# Patient Record
Sex: FEMALE | Race: WHITE | URBAN - METROPOLITAN AREA
[De-identification: names, ages, dates, MRNs, and addresses within clinical notes are randomized per-mention and may not be internally consistent; named-entity substitution may affect disease eponyms.]

---

## 2017-01-18 RX ORDER — AMOXICILLIN AND CLAVULANATE POTASSIUM 875; 125 MG/1; MG/1
1 TABLET, FILM COATED ORAL 2 TIMES DAILY
Qty: 6 TABLET | Refills: 0 | Status: SHIPPED | OUTPATIENT
Start: 2017-01-18 | End: 2017-08-02 | Stop reason: SDUPTHER

## 2017-02-27 ENCOUNTER — OFFICE VISIT (OUTPATIENT)
Dept: ORTHOPEDIC SURGERY | Age: 57
End: 2017-02-27

## 2017-02-27 VITALS
WEIGHT: 165 LBS | BODY MASS INDEX: 27.49 KG/M2 | DIASTOLIC BLOOD PRESSURE: 74 MMHG | HEART RATE: 77 BPM | SYSTOLIC BLOOD PRESSURE: 114 MMHG | HEIGHT: 65 IN

## 2017-02-27 DIAGNOSIS — Z96.641 S/P HIP REPLACEMENT, RIGHT: Primary | ICD-10-CM

## 2017-02-27 DIAGNOSIS — M16.11 PRIMARY OSTEOARTHRITIS OF RIGHT HIP: ICD-10-CM

## 2017-02-27 DIAGNOSIS — M25.551 RIGHT HIP PAIN: ICD-10-CM

## 2017-02-27 PROCEDURE — 73502 X-RAY EXAM HIP UNI 2-3 VIEWS: CPT | Performed by: ORTHOPAEDIC SURGERY

## 2017-02-27 PROCEDURE — 99213 OFFICE O/P EST LOW 20 MIN: CPT | Performed by: ORTHOPAEDIC SURGERY

## 2017-08-02 RX ORDER — AMOXICILLIN AND CLAVULANATE POTASSIUM 875; 125 MG/1; MG/1
1 TABLET, FILM COATED ORAL 2 TIMES DAILY
Qty: 6 TABLET | Refills: 0 | Status: SHIPPED | OUTPATIENT
Start: 2017-08-02 | End: 2017-08-05

## 2017-08-07 ENCOUNTER — TELEPHONE (OUTPATIENT)
Dept: ORTHOPEDIC SURGERY | Age: 57
End: 2017-08-07

## 2017-08-08 ENCOUNTER — OFFICE VISIT (OUTPATIENT)
Dept: DERMATOLOGY | Age: 57
End: 2017-08-08

## 2017-08-08 DIAGNOSIS — L65.0 TELOGEN EFFLUVIUM: ICD-10-CM

## 2017-08-08 DIAGNOSIS — H01.139 EYELID DERMATITIS, ECZEMATOUS, UNSPECIFIED LATERALITY: Primary | ICD-10-CM

## 2017-08-08 DIAGNOSIS — L81.4 SOLAR LENTIGO: ICD-10-CM

## 2017-08-08 PROCEDURE — 99213 OFFICE O/P EST LOW 20 MIN: CPT | Performed by: DERMATOLOGY

## 2018-02-08 RX ORDER — AMOXICILLIN AND CLAVULANATE POTASSIUM 875; 125 MG/1; MG/1
TABLET, FILM COATED ORAL
Qty: 6 TABLET | Refills: 0 | Status: SHIPPED | OUTPATIENT
Start: 2018-02-08 | End: 2018-08-28 | Stop reason: SDUPTHER

## 2018-08-14 ENCOUNTER — OFFICE VISIT (OUTPATIENT)
Dept: DERMATOLOGY | Age: 58
End: 2018-08-14

## 2018-08-14 DIAGNOSIS — R20.2 NOTALGIA PARESTHETICA: ICD-10-CM

## 2018-08-14 DIAGNOSIS — H01.139 EYELID DERMATITIS, ECZEMATOUS, UNSPECIFIED LATERALITY: Primary | ICD-10-CM

## 2018-08-14 PROCEDURE — 3017F COLORECTAL CA SCREEN DOC REV: CPT | Performed by: DERMATOLOGY

## 2018-08-14 PROCEDURE — 1036F TOBACCO NON-USER: CPT | Performed by: DERMATOLOGY

## 2018-08-14 PROCEDURE — G8427 DOCREV CUR MEDS BY ELIG CLIN: HCPCS | Performed by: DERMATOLOGY

## 2018-08-14 PROCEDURE — 99213 OFFICE O/P EST LOW 20 MIN: CPT | Performed by: DERMATOLOGY

## 2018-08-14 PROCEDURE — G8421 BMI NOT CALCULATED: HCPCS | Performed by: DERMATOLOGY

## 2018-08-14 NOTE — PROGRESS NOTES
determined to be abnormal: None. Well-appearing. 1.  Eyelids clear today. 2.  Right central mid upper back clear. Assessment and Plan     1. Eyelid dermatitis, eczematous - under good control    Protopic ointment twice daily as needed for recurrences. 2. Notalgia paresthetica     Education and reassurance. Sarna anti-itch lotion as needed.

## 2018-08-28 RX ORDER — AMOXICILLIN AND CLAVULANATE POTASSIUM 875; 125 MG/1; MG/1
TABLET, FILM COATED ORAL
Qty: 6 TABLET | Refills: 0 | Status: SHIPPED | OUTPATIENT
Start: 2018-08-28 | End: 2020-03-12

## 2019-08-14 ENCOUNTER — OFFICE VISIT (OUTPATIENT)
Dept: DERMATOLOGY | Age: 59
End: 2019-08-14
Payer: MEDICARE

## 2019-08-14 DIAGNOSIS — L72.0 EPIDERMOID CYST: Primary | ICD-10-CM

## 2019-08-14 DIAGNOSIS — L82.1 SK (SEBORRHEIC KERATOSIS): ICD-10-CM

## 2019-08-14 DIAGNOSIS — H01.139 EYELID DERMATITIS, ECZEMATOUS, UNSPECIFIED LATERALITY: ICD-10-CM

## 2019-08-14 DIAGNOSIS — D22.72 NEVUS OF LEFT THIGH: ICD-10-CM

## 2019-08-14 PROCEDURE — 1036F TOBACCO NON-USER: CPT | Performed by: DERMATOLOGY

## 2019-08-14 PROCEDURE — 99213 OFFICE O/P EST LOW 20 MIN: CPT | Performed by: DERMATOLOGY

## 2019-08-14 PROCEDURE — 3017F COLORECTAL CA SCREEN DOC REV: CPT | Performed by: DERMATOLOGY

## 2019-08-14 PROCEDURE — G8427 DOCREV CUR MEDS BY ELIG CLIN: HCPCS | Performed by: DERMATOLOGY

## 2019-08-14 PROCEDURE — G8421 BMI NOT CALCULATED: HCPCS | Performed by: DERMATOLOGY

## 2019-08-14 RX ORDER — LANOLIN ALCOHOL/MO/W.PET/CERES
3 CREAM (GRAM) TOPICAL DAILY
COMMUNITY
End: 2022-08-18

## 2020-03-12 ENCOUNTER — OFFICE VISIT (OUTPATIENT)
Dept: ORTHOPEDIC SURGERY | Age: 60
End: 2020-03-12
Payer: MEDICARE

## 2020-03-12 VITALS — HEIGHT: 65 IN | WEIGHT: 164.9 LBS | BODY MASS INDEX: 27.47 KG/M2

## 2020-03-12 PROCEDURE — G8419 CALC BMI OUT NRM PARAM NOF/U: HCPCS | Performed by: ORTHOPAEDIC SURGERY

## 2020-03-12 PROCEDURE — G8484 FLU IMMUNIZE NO ADMIN: HCPCS | Performed by: ORTHOPAEDIC SURGERY

## 2020-03-12 PROCEDURE — 1036F TOBACCO NON-USER: CPT | Performed by: ORTHOPAEDIC SURGERY

## 2020-03-12 PROCEDURE — 99203 OFFICE O/P NEW LOW 30 MIN: CPT | Performed by: ORTHOPAEDIC SURGERY

## 2020-03-12 PROCEDURE — G8427 DOCREV CUR MEDS BY ELIG CLIN: HCPCS | Performed by: ORTHOPAEDIC SURGERY

## 2020-03-12 PROCEDURE — 3017F COLORECTAL CA SCREEN DOC REV: CPT | Performed by: ORTHOPAEDIC SURGERY

## 2020-03-12 RX ORDER — IBUPROFEN 200 MG
200 TABLET ORAL EVERY 6 HOURS PRN
COMMUNITY

## 2020-03-12 NOTE — PROGRESS NOTES
injection    JOINT REPLACEMENT       Family History   Problem Relation Age of Onset    Heart Disease Mother     Heart Disease Father     Cancer Father         follicular lymphoma per PT      Social History     Socioeconomic History    Marital status:      Spouse name: None    Number of children: None    Years of education: None    Highest education level: None   Occupational History    None   Social Needs    Financial resource strain: None    Food insecurity     Worry: None     Inability: None    Transportation needs     Medical: None     Non-medical: None   Tobacco Use    Smoking status: Never Smoker    Smokeless tobacco: Never Used   Substance and Sexual Activity    Alcohol use: Yes     Alcohol/week: 0.0 standard drinks     Comment: 1-2 ON WK ENDS ONLY    Drug use: No    Sexual activity: None   Lifestyle    Physical activity     Days per week: None     Minutes per session: None    Stress: None   Relationships    Social connections     Talks on phone: None     Gets together: None     Attends Congregational service: None     Active member of club or organization: None     Attends meetings of clubs or organizations: None     Relationship status: None    Intimate partner violence     Fear of current or ex partner: None     Emotionally abused: None     Physically abused: None     Forced sexual activity: None   Other Topics Concern    None   Social History Narrative    None     Current Outpatient Medications   Medication Sig Dispense Refill    ibuprofen (ADVIL;MOTRIN) 200 MG tablet Take 200 mg by mouth every 6 hours as needed for Pain      melatonin 3 MG TABS tablet Take 3 mg by mouth daily      Calcium Carbonate-Vitamin D (CALCIUM-VITAMIN D) 500-200 MG-UNIT per tablet Take 1 tablet by mouth 2 times daily (with meals)      pimecrolimus (ELIDEL) 1 % cream Apply to the eyelids twice daily if needed for dermatitis.  30 g 2    Multiple Vitamins-Minerals (THERAPEUTIC MULTIVITAMIN-MINERALS) tablet Take 1 tablet by mouth daily       No current facility-administered medications for this visit. Current Outpatient Medications on File Prior to Visit   Medication Sig Dispense Refill    ibuprofen (ADVIL;MOTRIN) 200 MG tablet Take 200 mg by mouth every 6 hours as needed for Pain      melatonin 3 MG TABS tablet Take 3 mg by mouth daily      Calcium Carbonate-Vitamin D (CALCIUM-VITAMIN D) 500-200 MG-UNIT per tablet Take 1 tablet by mouth 2 times daily (with meals)      pimecrolimus (ELIDEL) 1 % cream Apply to the eyelids twice daily if needed for dermatitis. 30 g 2    Multiple Vitamins-Minerals (THERAPEUTIC MULTIVITAMIN-MINERALS) tablet Take 1 tablet by mouth daily       No current facility-administered medications on file prior to visit. No Known Allergies    Review of Systems:  Relevant review of systems reviewed and available in the patient's chart    Vital Signs: There were no vitals filed for this visit. General Exam:   Constitutional: Patient is adequately groomed with no evidence of malnutrition  DTRs: Deep tendon reflexes are intact  Mental Status: The patient is oriented to time, place and person. The patient's mood and affect are appropriate. Lymphatic: The lymphatic examination bilaterally reveals all areas to be without enlargement or induration. Vascular: Examination reveals no swelling or calf tenderness. Peripheral pulses are palpable and 2+. Neurological: The patient has good coordination. There is no weakness or sensory deficit. Body mass index is 27.44 kg/m². Left hip Examination:    Inspection:  No erythema or signs of infection. There are no cutaneous lesions. Palpation: Mild tenderness to palpation over the greater trochanteric region. Range of Motion:  Painful limited range of motion of the hip particularly with flexion and external rotation causing reproducible groin pain. Strength:  Core/5 strength in flexion and abduction limited by pain.     Special Tests:  There is a negative log roll maneuver. Positive straight leg raise against resistance. Positive Markus's test.  Negative Homans test.    Skin: There are no rashes, ulcerations or lesions. Gait: Mildly antalgic favoring the right side      Additional Comments:       Additional Examinations:         Contralateral Exam: Examination of the right hip reveals intact skin. The patient demonstrates full painless range of motion with regards to flexion, abduction, internal and external rotation. There is no tenderness about the greater trochanter. There is a negative straight leg raise against resistance. Strength is 5/5 throughout all planes. Lower Back: Examination of the lower back does not show any tenderness, deformity or injury. Range of motion is unremarkable. There is no gross instability. There are no rashes, ulcerations or lesions. Strength and tone are normal.    Radiology:     X-rays obtained and reviewed in office:  Views 2 views including AP pelvis and lateral  Location LEFT hip  Impression no fractures or malalignment identified. There is moderate osteoarthritis of the femoral acetabular joint space narrowing with subchondral cysts, sclerosis and osteophyte formation. Impression:  Encounter Diagnoses   Name Primary?  Pain of left hip joint Yes    Primary osteoarthritis of left hip        Office Procedures:  Orders Placed This Encounter   Procedures    XR HIP LEFT (2-3 VIEWS)     Standing Status:   Future     Number of Occurrences:   1     Standing Expiration Date:   3/9/2021       Treatment Plan:  I discussed with the patient the nature of osteoarthritis of the hip. We talked about treatment of arthritis and the various options that are involved with this. The patient understands that the treatments can vary from essentially doing nothing to a total joint replacement arthroplasty for arthritis.  I then went on to describe the utilization of glucosamine and chondroitin sulfate as a

## 2020-06-01 ENCOUNTER — OFFICE VISIT (OUTPATIENT)
Dept: ORTHOPEDIC SURGERY | Age: 60
End: 2020-06-01
Payer: MEDICARE

## 2020-06-01 VITALS — BODY MASS INDEX: 23.32 KG/M2 | HEIGHT: 65 IN | WEIGHT: 140 LBS

## 2020-06-01 PROCEDURE — 1036F TOBACCO NON-USER: CPT | Performed by: ORTHOPAEDIC SURGERY

## 2020-06-01 PROCEDURE — 3017F COLORECTAL CA SCREEN DOC REV: CPT | Performed by: ORTHOPAEDIC SURGERY

## 2020-06-01 PROCEDURE — 99214 OFFICE O/P EST MOD 30 MIN: CPT | Performed by: ORTHOPAEDIC SURGERY

## 2020-06-01 PROCEDURE — G8420 CALC BMI NORM PARAMETERS: HCPCS | Performed by: ORTHOPAEDIC SURGERY

## 2020-06-01 PROCEDURE — G8427 DOCREV CUR MEDS BY ELIG CLIN: HCPCS | Performed by: ORTHOPAEDIC SURGERY

## 2020-06-01 NOTE — PROGRESS NOTES
patellar    Additional Comments:     Examination of the right hip reveals intact skin. The patient demonstrates full painless range of motion with regards to flexion, abduction, internal and external rotation. There is no tenderness about the greater trochanter. There is a negative straight leg raise against resistance. Strength is 5/5 throughout all planes. Additional Examinations:         Right Upper Extremity:  Examination of the right upper extremity does not show any tenderness, deformity or injury. Range of motion is unremarkable. There is no gross instability. There are no rashes, ulcerations or lesions. Strength and tone are normal.  Left Upper Extremity: Examination of the left upper extremity does not show any tenderness, deformity or injury. Range of motion is unremarkable. There is no gross instability. There are no rashes, ulcerations or lesions. Strength and tone are normal.    Radiology:     X-rays obtained previously and reviewed in office:  Views 2  Location left hip  Impression no fractures or malalignment identified. There is end-stage osteoarthritis of the hip with femoral acetabular joint space narrowing with subchondral cysts, sclerosis and osteophyte formation. Failed Outpatient management or Previous Surgical Intervention  Patient has undergone conservative treatment of left hip for the past 6 months. Patient has undergone therapy consisting of NSAIDs, Tylenol, activity modifications for 6 months with no improvement in  pain relief or function. Impression:  Encounter Diagnosis   Name Primary?     Primary osteoarthritis of left hip Yes       Office Procedures:  Orders Placed This Encounter   Procedures    [facility] Occupation Therapy     Referral Priority:   Routine     Referral Type:   Eval and Treat     Referral Reason:   Specialty Services Required     Requested Specialty:   Occupational Therapy     Number of Visits Requested:   1       Treatment Plan:  I discussed with the patient the nature of osteoarthritis of the hip. We talked about treatment of arthritis and the various options that are involved with this. The patient understands that the treatments can vary from essentially doing nothing to a total joint replacement arthroplasty for arthritis. I then went on to describe the utilization of glucosamine and chondroitin sulfate as a joint nutrition product. We talked about the fact that this is essentially a joint vitamin with typically minimal side effects. We also talked about utilization of prescription over-the-counter anti-inflammatory medications as the next option. We talked about the typical course of this type of treatment and the fact that often times in the treatment for significant arthritis, this is successful less than half the time. We also talked about the corticosteroid injections and the fact that this can give a brief window of relief, but does not cure the problem; in fact, the pain often has a rebound effect in 6-10 weeks after the steroid has worn off. Lastly we discussed total joint replacement arthroplasty as the final and definitive step in treatment of arthritis. Patient realizes the magnitude of this type of treatment as well as having voiced a general understanding to the duration of the prosthesis. The patient voiced understanding to these continuum of treatment options. At this time the patient would like to go ahead and proceed with surgical intervention. We have recommended a left anterior total hip arthroplasty with robotic assistance. The patient is aware that this will require a nondiagnostic CT scan for surgical planning with the insurance company may or may not provide coverage for. This would be an out-of-pocket expense that the patient would be financially responsible for. We discussed the risk, benefits and complications of total hip replacement arthroplasty surgery.  We specifically discussed concerns

## 2020-06-01 NOTE — LETTER
Attention    These are medical screening labs, not pre-admission surgery labs. Via Chip Serrano M.D.  [phone]  [facility], [address] [facility]    Date:  2020    Name: Arnaldo Houston    : 1960    Please take this form with you.       THE FOLLOWING LABS NEED TO BE COMPLETED:    _x__UA  _x__URINE C/S (THIS NEEDS TO BE DONE EVEN IF THE UA IS NORMAL)  _x__CBC W/ DIFF  _x__PT/INR  _x__PTT  _x__TRANSFERRIN  _x__ALBUMIN  _x__CHEM 7  _x__HEMAGLOBIN A1-C  ____OTHER: _____________________________________________                         Diagnosis:  LT HIP OSTEOARTHRITIS            RT KNEE OA M17.11      LT KNEE OA M17.12         RT HIP OA  M16.11         LT HIP OA M16.12         RT SHLD OA  M19.011   LT SHLD OA  M19.012             Z01.812  (Pre-op examination code)      2020 4:20 PM  Heraclio Lewis PA-C
the PT Evaluation and completed labs has been determined you will be called and set up for surgery. This may take 1-2 days to check results and return your phone call. You will not be called about lab results if everything is normal.      Please make sure you have not blocked our number. Christina Simon will call from [phone] / [phone]. Also, please make sure your voice mail is not full.

## 2020-06-11 DIAGNOSIS — M25.552 PAIN OF LEFT HIP JOINT: Primary | ICD-10-CM

## 2020-06-30 ENCOUNTER — HOSPITAL ENCOUNTER (OUTPATIENT)
Dept: PHYSICAL THERAPY | Age: 60
Setting detail: THERAPIES SERIES
Discharge: HOME | End: 2020-06-30
Payer: COMMERCIAL

## 2020-06-30 PROCEDURE — 97161 PT EVAL LOW COMPLEX 20 MIN: CPT | Performed by: PHYSICAL THERAPIST

## 2020-06-30 PROCEDURE — 97110 THERAPEUTIC EXERCISES: CPT | Performed by: PHYSICAL THERAPIST

## 2020-06-30 NOTE — FLOWSHEET NOTE
[facility] - Orthopaedics and Sports Rehabilitation, [facility]    Physical Therapy Daily Treatment Note  Date:  2020    Patient Name:  Amol Cox    :  1960  MRN: [id number]  Restrictions/Precautions:    Medical/Treatment Diagnosis Information:  · Diagnosis: Left hip OA  M25.552  · Treatment Diagnosis: PT treatment diagnosis:  left hip pain  E00.085  Insurance/Certification information:    [employer]  60 visits/yr,  $50 copay  Physician Information:  Referring Practitioner: Dr Dominik Escobar of care signed (Y/N):     Date of Patient follow up with Physician:     G-Code (if applicable):      Date G-Code Applied:         Progress Note: [x]  Yes  []  No  Next due by: Visit #10       Latex Allergy:  [x]NO      []YES  Preferred Language for Healthcare:   [x]English       []other:    Visit # Insurance Allowable   1 60     Pain level:  6/10     SUBJECTIVE:  See eval    OBJECTIVE: See eval  Observation:   Test measurements:      RESTRICTIONS/PRECAUTIONS:  R THR, L ankle surgery    Exercises/Interventions:     Therapeutic Ex Sets/reps Notes        Seated HS stretch 3x30 sec HEP   Seated calf stretch 3x30 sec HEP   Seated QS BLEs 10 sec 10x HEP   Supine heelslide 1x10 HEP   Isometric abdominal 10 sec 10x HEP   LAQ  1x10 HEP   minisquats 1x10 HEP   Ankle Pumps x30 HEP                                      Manual Intervention                                   NMR re-education                                                      Therapeutic Exercise and NMR EXR  [x] (11363) Provided verbal/tactile cueing for activities related to strengthening, flexibility, endurance, ROM for improvements in LE, proximal hip, and core control with self care, mobility, lifting, ambulation.  [] (54818) Provided verbal/tactile cueing for activities related to improving balance, coordination, kinesthetic sense, posture, motor skill, proprioception  to assist with LE, proximal hip, and core control in self care, mobility, lifting, Traction (12138)  [] ES(attended) (54078)      [] ES (un) (01545):     GOALS:   Patient stated goal: To be prepared for surgery      Therapist goals for Patient:    Short Term Goals: To be achieved in: 2 weeks  1. Independent in HEP and progression per patient tolerance, in order to prevent re-injury and to prepare for surgery by strength and flexibility therex . Progression Towards Functional goals:  [] Patient is progressing as expected towards functional goals listed. [] Progression is slowed due to complexities listed. [] Progression has been slowed due to co-morbidities. [x] Plan just implemented, too soon to assess goals progression  [] Other:     ASSESSMENT:  See eval    Treatment/Activity Tolerance:  [x] Patient tolerated treatment well [] Patient limited by fatique  [] Patient limited by pain  [] Patient limited by other medical complications  [] Other:     Prognosis: [x] Good [] Fair  [] Poor        Recommend pt follow through with surgery date, recommend DC to home following sx.     Patient Requires Follow-up: [] Yes  [x] No    PLAN: See eval      [] Continue per plan of care [] Alter current plan (see comments)  [x] Plan of care initiated [] Discharge    Electronically signed by: Soni Ramos PT, OMT-C

## 2020-07-21 LAB
ALBUMIN SERPL-MCNC: 4.1 G/DL (ref 3.4–5)
ANION GAP SERPL CALCULATED.3IONS-SCNC: 11 MMOL/L (ref 3–16)
APTT: 33.2 SEC (ref 24.2–36.2)
BASOPHILS ABSOLUTE: 0 K/UL (ref 0–0.2)
BASOPHILS RELATIVE PERCENT: 0.9 %
BILIRUBIN URINE: NEGATIVE
BLOOD, URINE: NEGATIVE
BUN BLDV-MCNC: 16 MG/DL (ref 7–20)
CALCIUM SERPL-MCNC: 8.9 MG/DL (ref 8.3–10.6)
CHLORIDE BLD-SCNC: 105 MMOL/L (ref 99–110)
CLARITY: CLEAR
CO2: 24 MMOL/L (ref 21–32)
COLOR: YELLOW
CREAT SERPL-MCNC: 0.8 MG/DL (ref 0.6–1.2)
EOSINOPHILS ABSOLUTE: 0 K/UL (ref 0–0.6)
EOSINOPHILS RELATIVE PERCENT: 0.7 %
GFR AFRICAN AMERICAN: >60
GFR NON-AFRICAN AMERICAN: >60
GLUCOSE BLD-MCNC: 89 MG/DL (ref 70–99)
GLUCOSE URINE: NEGATIVE MG/DL
HCT VFR BLD CALC: 37.5 % (ref 36–48)
HEMOGLOBIN: 12.3 G/DL (ref 12–16)
INR BLD: 1.03 (ref 0.86–1.14)
KETONES, URINE: NEGATIVE MG/DL
LEUKOCYTE ESTERASE, URINE: NEGATIVE
LYMPHOCYTES ABSOLUTE: 1.1 K/UL (ref 1–5.1)
LYMPHOCYTES RELATIVE PERCENT: 27.6 %
MCH RBC QN AUTO: 32.8 PG (ref 26–34)
MCHC RBC AUTO-ENTMCNC: 32.8 G/DL (ref 31–36)
MCV RBC AUTO: 100.1 FL (ref 80–100)
MICROSCOPIC EXAMINATION: NORMAL
MONOCYTES ABSOLUTE: 0.4 K/UL (ref 0–1.3)
MONOCYTES RELATIVE PERCENT: 9.1 %
NEUTROPHILS ABSOLUTE: 2.5 K/UL (ref 1.7–7.7)
NEUTROPHILS RELATIVE PERCENT: 61.7 %
NITRITE, URINE: NEGATIVE
PDW BLD-RTO: 12.7 % (ref 12.4–15.4)
PH UA: 6 (ref 5–8)
PLATELET # BLD: 207 K/UL (ref 135–450)
PMV BLD AUTO: 9.8 FL (ref 5–10.5)
POTASSIUM SERPL-SCNC: 4.1 MMOL/L (ref 3.5–5.1)
PROTEIN UA: NEGATIVE MG/DL
PROTHROMBIN TIME: 12 SEC (ref 10–13.2)
RBC # BLD: 3.75 M/UL (ref 4–5.2)
SODIUM BLD-SCNC: 140 MMOL/L (ref 136–145)
SPECIFIC GRAVITY UA: 1.03 (ref 1–1.03)
TRANSFERRIN: 252 MG/DL (ref 200–360)
URINE TYPE: NORMAL
UROBILINOGEN, URINE: 0.2 E.U./DL
WBC # BLD: 4 K/UL (ref 4–11)

## 2020-07-22 LAB
ESTIMATED AVERAGE GLUCOSE: 96.8 MG/DL
HBA1C MFR BLD: 5 %
URINE CULTURE, ROUTINE: NORMAL

## 2020-08-05 ENCOUNTER — TELEPHONE (OUTPATIENT)
Dept: ORTHOPEDIC SURGERY | Age: 60
End: 2020-08-05

## 2020-08-11 LAB
ABO GROUPING: NORMAL
ANTIBODY SCREEN: NEGATIVE
RH FACTOR: NEGATIVE

## 2020-08-12 ENCOUNTER — OFFICE VISIT (OUTPATIENT)
Dept: DERMATOLOGY | Age: 60
End: 2020-08-12
Payer: COMMERCIAL

## 2020-08-12 VITALS — TEMPERATURE: 98 F

## 2020-08-12 PROCEDURE — 99213 OFFICE O/P EST LOW 20 MIN: CPT | Performed by: DERMATOLOGY

## 2020-08-12 NOTE — PROGRESS NOTES
[facility]  Emerson Gomez MD  [phone]      Alissa Downing  1960    56 y.o. female     Date of Visit: 2020    Chief Complaint: skin lesions    History of Present Illness:    1. She returns today to follow-up for history of eyelid dermatitis-still comes and goes. Improves with use of Protopic 0.1% ointment. 2.  She has stable pigmented lesions on the lower extremities. 3.  She also complains of few asymptomatic lesions on the chest.      Review of Systems:  Skin: No new or changing moles. Past Medical History, Family History, Surgical History, Medications and Allergies reviewed. Past Medical History:   Diagnosis Date    Arthritis     Fractures     LEFT ANKLE FX X2    PONV (postoperative nausea and vomiting)      Past Surgical History:   Procedure Laterality Date    ANKLE FRACTURE SURGERY Left     BONE CHIP REMOVED     SECTION      X1    HAND SURGERY Left     PINKY SEVERED NERVE    HIP ARTHROPLASTY Right 16    anterior    HIP SURGERY Right 11/3/15    injection    JOINT REPLACEMENT         No Known Allergies  Outpatient Medications Marked as Taking for the 20 encounter (Office Visit) with Calli Main MD   Medication Sig Dispense Refill    mupirocin (BACTROBAN) 2 % ointment 1 22 gram tube. Apply 1 cm (small drop) to a q-tip and swab the inside of each nostril twice a day starting 5 days prior to surgery.  1 Tube 0    ibuprofen (ADVIL;MOTRIN) 200 MG tablet Take 200 mg by mouth every 6 hours as needed for Pain      melatonin 3 MG TABS tablet Take 3 mg by mouth daily      Calcium Carbonate-Vitamin D (CALCIUM-VITAMIN D) 500-200 MG-UNIT per tablet Take 1 tablet by mouth 2 times daily (with meals)      Multiple Vitamins-Minerals (THERAPEUTIC MULTIVITAMIN-MINERALS) tablet Take 1 tablet by mouth daily         Social History:  Occupation:  Volunteers at [employer], former teacher.       Physical Examination       The following were examined and determined to be normal: Psych/Neuro, Scalp/hair, Head/face, Conjunctivae/eyelids, Gums/teeth/lips, Neck, Breast/axilla/chest, Abdomen, Back, RUE, LUE, RLE, LLE and Nails/digits. The following were examined and determined to be abnormal: None. Well appearing. 1.  Clear. 2.  Lower extremities with several well defined round smooth light brown macules and patches. 3.  Upper chest -few stuck on appearing waxy brown papules. Assessment and Plan     1. Eyelid dermatitis, eczematous - clear today    Protopic 0.1% ointment twice daily as needed for recurrences. 2. Solar lentigines    Monitor for change. 3.  Seborrheic keratoses    Reassurance. Return in about 1 year (around 8/12/2021).

## 2020-08-13 LAB — SARS-COV-2: NOT DETECTED

## 2020-08-14 ENCOUNTER — TELEPHONE (OUTPATIENT)
Dept: ORTHOPEDIC SURGERY | Age: 60
End: 2020-08-14

## 2020-08-17 LAB
ABO GROUPING: NORMAL
RH FACTOR: NEGATIVE

## 2020-08-18 RX ORDER — CEPHALEXIN 500 MG/1
500 CAPSULE ORAL 4 TIMES DAILY
Qty: 4 CAPSULE | Refills: 0 | Status: SHIPPED | OUTPATIENT
Start: 2020-08-18 | End: 2020-09-21 | Stop reason: ALTCHOICE

## 2020-08-20 ENCOUNTER — TELEPHONE (OUTPATIENT)
Dept: ORTHOPEDIC SURGERY | Age: 60
End: 2020-08-20

## 2020-08-20 NOTE — TELEPHONE ENCOUNTER
PATIENT CALLED, HAD SURGERY ON MONDAY, CALLED HAS QUESTIONS, MISSED A PHONE CALLED, TRANSFER HER BACK TO DR. Khari Reynoso OFFICE. [phone]

## 2020-08-24 ENCOUNTER — TELEPHONE (OUTPATIENT)
Dept: ORTHOPEDIC SURGERY | Age: 60
End: 2020-08-24

## 2020-08-24 NOTE — TELEPHONE ENCOUNTER
1.  There was no information on the prescription as to how long to be taking the aspirin. 2. Changed my bandage today and everything looked great. How long should I keep the bandage on going forward? 3. There is a warning on our meloxicam prescription about taking it in conjunction with aspirin. How do I proceed with the aspirin requirement as Im supposed to start to take the meloxicam tomorrow? 4. How long should I be using crutches? 5. How long do I need to wear the support stockings? When am I going to be cleared to drive? I know this was an issue if I was taking any of the narcotics but again have been free from narcotics since Friday mid day.

## 2020-08-31 ENCOUNTER — VIRTUAL VISIT (OUTPATIENT)
Dept: ORTHOPEDIC SURGERY | Age: 60
End: 2020-08-31

## 2020-08-31 PROCEDURE — 99024 POSTOP FOLLOW-UP VISIT: CPT | Performed by: PHYSICIAN ASSISTANT

## 2020-08-31 NOTE — PROGRESS NOTES
Mikey Katz is a 56 y.o. female being evaluated by a Virtual Visit (video visit) encounter to address concerns as mentioned above. A caregiver was present when appropriate. Due to this being a TeleHealth encounter (During FSOOZ-83 public health emergency), evaluation of the following organ systems was limited: Vitals/Constitutional/EENT/Resp/CV/GI//MS/Neuro/Skin/Heme-Lymph-Imm. Pursuant to the emergency declaration under the [address] [place] [address] and the [employer] Act, this Virtual Visit was conducted with patient's (and/or legal guardian's) consent, to reduce the patient's risk of exposure to COVID-19 and provide necessary medical care. The patient (and/or legal guardian) has also been advised to contact this office for worsening conditions or problems, and seek emergency medical treatment and/or call 911 if deemed necessary. Services were provided through a video synchronous discussion virtually to substitute for in-person clinic visit. Patient's location was at their home. --Stephanie Hurd PA-C on 8/31/2020 at 4:32 PM    An electronic signature was used to authenticate this note. History of present illness: The patient returns today after left total hip replacement. Pain control as been satisfactory with oral medications. There have been no fevers or chills. Physical examination: The incision is clean, dry, and intact with no drainage or signs of infection. There is expected swelling with no evidence of DVT and a negative Homans sign. Neurovascular exam is intact. Assessment/plan: We would like to begin physical therapy to restore range of motion and strength, at 4 weeks. . The patient was given local wound care instructions. We did not refilled their pain medication today. He will followup in 2 weeks for reevaluation.

## 2020-09-21 ENCOUNTER — OFFICE VISIT (OUTPATIENT)
Dept: ORTHOPEDIC SURGERY | Age: 60
End: 2020-09-21

## 2020-09-21 ENCOUNTER — HOSPITAL ENCOUNTER (OUTPATIENT)
Dept: PHYSICAL THERAPY | Age: 60
Setting detail: THERAPIES SERIES
Discharge: HOME | End: 2020-09-21
Payer: COMMERCIAL

## 2020-09-21 PROCEDURE — 97110 THERAPEUTIC EXERCISES: CPT | Performed by: PHYSICAL THERAPIST

## 2020-09-21 PROCEDURE — 97161 PT EVAL LOW COMPLEX 20 MIN: CPT | Performed by: PHYSICAL THERAPIST

## 2020-09-21 PROCEDURE — 99024 POSTOP FOLLOW-UP VISIT: CPT | Performed by: ORTHOPAEDIC SURGERY

## 2020-09-21 PROCEDURE — 97140 MANUAL THERAPY 1/> REGIONS: CPT | Performed by: PHYSICAL THERAPIST

## 2020-09-21 RX ORDER — AMOXICILLIN AND CLAVULANATE POTASSIUM 875; 125 MG/1; MG/1
TABLET, FILM COATED ORAL
Qty: 6 TABLET | Refills: 0 | Status: SHIPPED | OUTPATIENT
Start: 2020-09-21 | End: 2021-08-18

## 2020-09-21 NOTE — PLAN OF CARE
feels she is improving since surgery. Pt says has two story home but has first floor master arrangement so has been able to avoid stairs. Pt has pain and difficulty with walking more than 20 min, stiff after prolonged sitting, don/doff shoes/socks, pt still disturbed by pain but is improving since surgery and unable to sleep on side like she usually does. Pt has 22 mo old grandchild that she wants to be sure she can  safely. Pt has weaned herself from crutches last week and feels that has gone really well. Pt is still icing throughout the day and using Mobic. 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    Living Status/Prior Level of Function: Independent with ADLs and IADLs, return to walking and care for grandchild    OBJECTIVE:     ROM LEFT RIGHT   HIP Flex     HIP Abd     HIP Ext     HIP IR     HIP ER     Knee ext     Knee Flex     Ankle PF     Ankle DF     Ankle In     Ankle Ev     Strength  LEFT RIGHT   HIP Flexors     HIP Abductors     HIP Ext     Hip ER     Knee EXT (quad)     Knee Flex (HS)     Ankle DF     Ankle PF     Ankle Inv     Ankle EV          Circumference  Mid apex  7 cm prox             Reflexes/Sensation:    []Dermatomes/Myotomes intact    [x]Reflexes equal and normal bilaterally   []Other:    Joint mobility:    []Normal    []Hypo   []Hyper    Palpation: mild tenderness around anterior incision site, tight and tender L ITB    Functional Mobility/Transfers: WFL    Posture:  WNL    Bandages/Dressings/Incisions: pt reported healing well without concern, pt's clothing limited observation    Gait: (include devices/WB status) no AD, antalgic gait with mild L Trendelenberg and compensated lateral torso lean, mild reduced hip extension during terminal stance. Orthopedic Special Tests: n/a                       [x] Patient history, allergies, meds reviewed. Medical chart reviewed. See intake form. Review Of Systems (ROS):  [x]Performed Review of systems (Integumentary, CardioPulmonary, Neurological) by intake and observation. Intake form has been scanned into medical record. Patient has been instructed to contact their primary care physician regarding ROS issues if not already being addressed at this time.       Co-morbidities/Complexities (which will affect course of rehabilitation):   []None           Arthritic conditions   []Rheumatoid arthritis (M05.9)  []Osteoarthritis (M19.91)   Cardiovascular conditions   []Hypertension (I10)  []Hyperlipidemia (E78.5)  []Angina pectoris (I20)  []Atherosclerosis (I70)   Musculoskeletal conditions   []Disc pathology   []Congenital spine pathologies   []Prior surgical intervention  []Osteoporosis (M81.8)  []Osteopenia (M85.8)   Endocrine conditions   []Hypothyroid (E03.9)  []Hyperthyroid Gastrointestinal conditions   []Constipation (Z18.62)   Metabolic conditions   []Morbid obesity (E66.01)  []Diabetes type 1(E10.65) or 2 (E11.65)   []Neuropathy (G60.9)     Pulmonary conditions   []Asthma (J45)  []Coughing   []COPD (J44.9)   Psychological Disorders  []Anxiety (F41.9)  []Depression (F32.9)   []Other:   []Other:          Barriers to/and or personal factors that will affect rehab potential:              []Age  []Sex              []Motivation/Lack of Motivation                        []Co-Morbidities              []Cognitive Function, education/learning barriers              []Environmental, home barriers              []profession/work barriers  []past PT/medical experience  []other:  Justification:     Falls Risk Assessment (30 days):   [x] Falls Risk assessed and no intervention required. [] Falls Risk assessed and Patient requires intervention due to being higher risk   TUG score (>12s at risk):     [] Falls education provided, including           ASSESSMENT: Pt is s/p L KAY performed by Dr. Lex Perez 8/17/2020 with PMHx of R KAY in 2016. She demonstrates an antalgic gait with reduced use of hip ext ROM and Trendelenberg weakness, decreased hip abd, ext, and flex ROM, decreased hip and glute strength, and limited tolerance for prolonged positions and ambulation. PT will benefit from PT to address these impairments and improve ADLs and community ambulation. Functional Impairments:      []Noted lumbar/proximal hip/LE joint hypomobility   [x]Decreased LE functional ROM   [x]Decreased core/proximal hip strength and neuromuscular control   [x]Decreased LE functional strength   []Reduced balance/proprioceptive control   []other:      Functional Activity Limitations (from functional questionnaire and intake)   [x]Reduced ability to tolerate prolonged functional positions   []Reduced ability or difficulty with changes of positions or transfers between positions   []Reduced ability to maintain good posture and demonstrate good body mechanics with sitting, bending, and lifting   [x]Reduced ability to sleep   [] Reduced ability or tolerance with driving and/or computer work   [x]Reduced ability to perform lifting, carrying tasks   [x]Reduced ability to squat   []Reduced ability to forward bend   [x]Reduced ability to ambulate prolonged functional periods/distances/surfaces   [x]Reduced ability to ascend/descend stairs   []Reduced ability to run, hop, cut or jump   []other:    Participation Restrictions   [x]Reduced participation in self care activities   [x]Reduced participation in home management activities   []Reduced participation in work activities   [x]Reduced participation in social activities.    []Reduced participation in sport/recreation activities. Classification :    [x]Signs/symptoms consistent with post-surgical status including decreased ROM, strength and function. []Signs/symptoms consistent with joint sprain/strain   []Signs/symptoms consistent with patella-femoral syndrome   []Signs/symptoms consistent with knee OA/hip OA   []Signs/symptoms consistent with internal derangement of knee/Hip   []Signs/symptoms consistent with functional hip weakness/NMR control      []Signs/symptoms consistent with tendinitis/tendinosis    []signs/symptoms consistent with pathology which may benefit from Dry needling      []other:      Prognosis/Rehab Potential:      []Excellent   [x]Good    []Fair   []Poor    Tolerance of evaluation/treatment:    []Excellent   [x]Good    []Fair   []Poor  Physical Therapy Evaluation Complexity Justification  [x] A history of present problem with:  [x] no personal factors and/or comorbidities that impact the plan of care;  []1-2 personal factors and/or comorbidities that impact the plan of care  []3 personal factors and/or comorbidities that impact the plan of care  [x] An examination of body systems using standardized tests and measures addressing any of the following: body structures and functions (impairments), activity limitations, and/or participation restrictions;:  [x] a total of 1-2 or more elements   [] a total of 3 or more elements   [] a total of 4 or more elements   [x] A clinical presentation with:  [x] stable and/or uncomplicated characteristics   [] evolving clinical presentation with changing characteristics  [] unstable and unpredictable characteristics;   [x] Clinical decision making of [x] low, [] moderate, [] high complexity using standardized patient assessment instrument and/or measurable assessment of functional outcome.     [x] EVAL (LOW) 08380 (typically 20 minutes face-to-face)  [] EVAL (MOD) 51362 (typically 30 minutes face-to-face)  [] EVAL (HIGH) 78853 (typically 45 minutes face-to-face)  [] reciprocal gait pattern. [] Progressing: [] Met: [] Not Met: [] Adjusted  5. Pt brodie to squat and  toddler grandchild with good mechanics and without pain.    [] Progressing: [] Met: [] Not Met: [] Adjusted     Electronically signed by:  Ld nAgelo, PT , MPT

## 2020-09-21 NOTE — PROGRESS NOTES
History of present illness: The patient returns today after left anterior total hip replacement on 8/17/2020. Pain control as been satisfactory with oral medications. There have been no fevers or chills. Pain Assessment  Location of Pain: Pelvis(hip)  Location Modifiers: Left  Severity of Pain: 3(at worst)  Quality of Pain: Dull, Aching  Duration of Pain: Persistent  Frequency of Pain: Intermittent  Aggravating Factors: Other (Comment)(sleeping at night)  Limiting Behavior: Some  Relieving Factors: Rest, Nsaids, Ice, Other (Comment)]    Physical examination left hip: The incision is clean, dry, and intact with no drainage or signs of infection. There is expected swelling with no evidence of DVT and a negative Homans sign. Neurovascular exam is intact. Leg length is appropriate. Radiographs: X-rays taken today including AP pelvis, and lateral views of the left hip show excellent alignment of the prosthesis. No evidence of septic or aseptic loosening or periprosthetic fractures. Assessment/plan: We would like to begin physical therapy to restore range of motion and strength. The patient was given local wound care instructions. We did not refill their pain medication today. They will followup in 3-4 weeks for reevaluation. Patient is feeling better. She did start physical therapy yesterday.   She still having some difficulty sleeping at night and we did discuss Tylenol PM.

## 2020-09-21 NOTE — FLOWSHEET NOTE
[facility] - orthopaedics and sports medicine; [facility]     Physical Therapy Treatment Note/ Progress Report:           Date:  2020    Patient Name:  Crispin Taylor    :  1960  MRN: [id number]  Restrictions/Precautions:    Medical/Treatment Diagnosis Information:  Diagnosis: W87.635 presence of artificial left hip  Treatment Diagnosis: M25.652 L hip stiffness, R26.2 difficulty walking, M62.82 L hip weakness, M25.462 L hip pain  Insurance/Certification information:  PT Insurance Information: [employer], 60 visits comb, needs auth  Physician Information:  Referring Practitioner: Dr. Masha Moon  Has the plan of care been signed (Y/N):        []  Yes  [x]  No     Date of Patient follow up with Physician: later today      Is this a Progress Report:     []  Yes  [x]  No        If Yes:  Date Range for reporting period:  Beginning 2020  Ending      Progress report will be due (10 Rx or 30 days whichever is less):       Recertification will be due (POC Duration  / 90 days whichever is less): 2020      Visit # Insurance Allowable Requires auth   1 ?     []no        [x]yes:     Functional Scale: LEFS: 47/80 = 59% ability, 49% disability   Date assessed:  2020          Number of Comorbidities:  []0     []1-2    []3+    Latex Allergy:  [x]NO      []YES  Preferred Language for Healthcare:   [x]English       []other:      Pain level:  2-3/10     SUBJECTIVE:  See eval    OBJECTIVE: See eval   Observation:    Test measurements:      RESTRICTIONS/PRECAUTIONS: none    Exercises/Interventions:     Therapeutic Ex (40041) Sets/Reps/ sec Notes/CUES   Clamshell 3x10 L HEP    Supine hip abd AROM 3x10 L HEP    Bridge 3x10 HEP    Supine clamshell Green, 3x10    Mini-squat 3x10 HEP    LAQ 3x10    Seated bilat hip IR Red, 3x10                             Manual Intervention (25836)     STM/MFR: L ITB 6 min                             NMR re-education (64540)  CUES NEEDED Therapeutic Activity (15784)                         Modalities     CP 10 min          Therapeutic Exercise and NMR EXR  [x] (13976) Provided verbal/tactile cueing for activities related to strengthening, flexibility, endurance, ROM for improvements in LE, proximal hip, and core control with self care, mobility, lifting, ambulation.  [] (01792) Provided verbal/tactile cueing for activities related to improving balance, coordination, kinesthetic sense, posture, motor skill, proprioception  to assist with LE, proximal hip, and core control in self care, mobility, lifting, ambulation and eccentric single leg control.      NMR and Therapeutic Activities:    [] (29690 or 30088) Provided verbal/tactile cueing for activities related to improving balance, coordination, kinesthetic sense, posture, motor skill, proprioception and motor activation to allow for proper function of core, proximal hip and LE with self care and ADLs  [] (81142) Gait Re-education- Provided training and instruction to the patient for proper LE, core and proximal hip recruitment and positioning and eccentric body weight control with ambulation re-education including up and down stairs     Home Exercise Program:    [x] (96306) Reviewed/Progressed HEP activities related to strengthening, flexibility, endurance, ROM of core, proximal hip and LE for functional self-care, mobility, lifting and ambulation/stair navigation   [] (31841)Reviewed/Progressed HEP activities related to improving balance, coordination, kinesthetic sense, posture, motor skill, proprioception of core, proximal hip and LE for self care, mobility, lifting, and ambulation/stair navigation      Manual Treatments:  PROM / STM / Oscillations-Mobs:  G-I, II, III, IV (PA's, Inf., Post.)  [x] (02217) Provided manual therapy to mobilize LE, proximal hip and/or LS spine soft tissue/joints for the purpose of modulating pain, promoting relaxation,  increasing ROM, reducing/eliminating soft tissue swelling/inflammation/restriction, improving soft tissue extensibility and allowing for proper ROM for normal function with self care, mobility, lifting and ambulation. Modalities:     [] GAME READY (VASO)- for significant edema, swelling, pain control. Charges:  Timed Code Treatment Minutes: 32'    Total Treatment Minutes: 61'        [x] EVAL (LOW) 455 1011   [] EVAL (MOD) 68241  [] EVAL (HIGH) 20942   [] RE-EVAL     [x] CN(35134) x   1  [] IONTO  [] NMR (17858) x     [] VASO  [x] Manual (46545) x    1  [] Other:  [] TA x      [] Mech Traction (02438)  [] ES(attended) (38687)      [] ES (un) (17795):       GOALS:   Patient stated goal: Get back to full activity level and better sleep. []? Progressing: []? Met: []? Not Met: []? Adjusted     Therapist goals for Patient:   Short Term Goals: To be achieved in: 2 weeks  1. Independent in HEP and progression per patient tolerance, in order to prevent re-injury. []? Progressing: []? Met: []? Not Met: []? Adjusted  2. Patient will have a decrease in pain to facilitate improvement in movement, function, and ADLs as indicated by Functional Deficits. []? Progressing: []? Met: []? Not Met: []? Adjusted     Long Term Goals: To be achieved in: 8 weeks  1. Disability index score of 10% or less for the LEFS to assist with reaching prior level of function. []? Progressing: []? Met: []? Not Met: []? Adjusted  2. Patient will demonstrate increased AROM  WNL and/or symmetrical to other side to allow for proper joint functioning as indicated by patients Functional Deficits. []? Progressing: []? Met: []? Not Met: []? Adjusted  3. Patient will demonstrate an increase in Strength to good proximal hip strength and control, within 5lb HHD in LE to allow for proper functional mobility as indicated by patients Functional Deficits. []? Progressing: []? Met: []? Not Met: []? Adjusted  4.  Patient will return to ambulation for at least 1 hour without AD, pain or limping and a normal reciprocal gait pattern. []? Progressing: []? Met: []? Not Met: []? Adjusted  5. Pt brodie to squat and  toddler grandchild with good mechanics and without pain. []? Progressing: []? Met: []? Not Met: []? Adjusted    Progression Towards Functional goals:  [] Patient is progressing as expected towards functional goals listed. [] Progression is slowed due to complexities listed. [] Progression has been slowed due to co-morbidities. [x] Plan just implemented, too soon to assess goals progression  [] Other:         Overall Progression Towards Functional goals/ Treatment Progress Update:  [] Patient is progressing as expected towards functional goals listed. [] Progression is slowed due to complexities/Impairments listed. [] Progression has been slowed due to co-morbidities. [x] Plan just implemented, too soon to assess goals progression <30days   [] Goals require adjustment due to lack of progress  [] Patient is not progressing as expected and requires additional follow up with physician  [] Other    Prognosis for POC: [x] Good [] Fair  [] Poor      Patient requires continued skilled intervention: [x] Yes  [] No    Treatment/Activity Tolerance:  [x] Patient able to complete treatment  [] Patient limited by fatigue  [] Patient limited by pain    [] Patient limited by other medical complications  [] Other:     ASSESSMENT: Pt did very well with initial session. She reported mild hip soreness at end of session but CP helped. Pt felt comfortable with starting the HEP on her own at home. PLAN: See eval. Pt to be seen 2xwk for 8 wks. Next visit: progress HEP, standing hip abd and ext, calf raises, bwd walking to regain  Hip ext.   [] Continue per plan of care [] Alter current plan (see comments above)  [x] Plan of care initiated [] Hold pending MD visit [] Discharge      Electronically signed by:  Sebastian Amaral, PT, MPT     Note: If patient

## 2020-09-23 ENCOUNTER — HOSPITAL ENCOUNTER (OUTPATIENT)
Dept: PHYSICAL THERAPY | Age: 60
Setting detail: THERAPIES SERIES
Discharge: HOME | End: 2020-09-23
Payer: COMMERCIAL

## 2020-09-23 PROCEDURE — 97140 MANUAL THERAPY 1/> REGIONS: CPT

## 2020-09-23 PROCEDURE — 97110 THERAPEUTIC EXERCISES: CPT

## 2020-09-23 NOTE — FLOWSHEET NOTE
[facility] - orthopaedics and sports medicine; [facility]     Physical Therapy Treatment Note/ Progress Report:           Date:  2020    Patient Name:  Princess Novoa    :  1960  MRN: [id number]  Restrictions/Precautions:    Medical/Treatment Diagnosis Information:  Diagnosis: R88.692 presence of artificial left hip  Treatment Diagnosis: M25.652 L hip stiffness, R26.2 difficulty walking, M62.82 L hip weakness, M25.462 L hip pain  Insurance/Certification information:  PT Insurance Information: [employer], 60 visits comb, needs auth  Physician Information:  Referring Practitioner: Dr. Arti Santana  Has the plan of care been signed (Y/N):        []  Yes  [x]  No     Date of Patient follow up with Physician: mid-Oct.    Surgery date: 2020    Is this a Progress Report:     []  Yes  [x]  No        If Yes:  Date Range for reporting period:  Beginning 2020  Ending      Progress report will be due (10 Rx or 30 days whichever is less):       Recertification will be due (POC Duration  / 90 days whichever is less): 2020      Visit # Insurance Allowable Requires auth   2 ? []no        [x]yes:     Functional Scale: LEFS: 47/80 = 59% ability, 49% disability   Date assessed:  2020          Number of Comorbidities:  [x]0     []1-2    []3+    Latex Allergy:  [x]NO      []YES  Preferred Language for Healthcare:   [x]English       []other:      Pain level:  2-3/10     SUBJECTIVE:  Pt reports she was sore for a little more than 24 hours after last session but icing helped. She says the icing helped and the HEP does not seem to cause any soreness and going well. OBJECTIVE:    Observation: mild tenderness still over the anterior L hip incision. Gait mildly antalgic with Trendelenberg pattern.     Test measurements:  Hip flexion: 105 deg, hip abd: 30 deg, Hip ER: 30 deg    RESTRICTIONS/PRECAUTIONS: none    Exercises/Interventions:     Therapeutic Ex (08510) Sets/Reps/ sec Notes/CUES   Recum bike AAROM 7 min    Clamshell Green 2x10 L HEP 9/21   Supine hip abd AROM  HEP 9/21   Bridge 2x10 HEP 9/21   Supine clamshell Green, 2x10    Mini-squat 3x10 HEP 9/21   LAQ w/ ball squeeze 3x10, 2# R/L    Seated bilat hip IR Red, 2x10    Standing hip abd & ext 2x10ea R/L     Prone quad stretch 2x30\"L HEP 9/23                  Manual Intervention (12297)     STM/MFR: L ITB 5 min    PROM: hip felx, abd, 90/90 ER, CINDY 10 min                        NMR re-education (75208)  CUES NEEDED   SLB 2x30\" L Cued for torso posture                                           Therapeutic Activity (73730)                         Modalities     CP 10 min          Therapeutic Exercise and NMR EXR  [x] (24897) Provided verbal/tactile cueing for activities related to strengthening, flexibility, endurance, ROM for improvements in LE, proximal hip, and core control with self care, mobility, lifting, ambulation.  [] (04865) Provided verbal/tactile cueing for activities related to improving balance, coordination, kinesthetic sense, posture, motor skill, proprioception  to assist with LE, proximal hip, and core control in self care, mobility, lifting, ambulation and eccentric single leg control.      NMR and Therapeutic Activities:    [] (75933 or 45529) Provided verbal/tactile cueing for activities related to improving balance, coordination, kinesthetic sense, posture, motor skill, proprioception and motor activation to allow for proper function of core, proximal hip and LE with self care and ADLs  [] (09556) Gait Re-education- Provided training and instruction to the patient for proper LE, core and proximal hip recruitment and positioning and eccentric body weight control with ambulation re-education including up and down stairs     Home Exercise Program:    [x] (94589) Reviewed/Progressed HEP activities related to strengthening, flexibility, endurance, ROM of core, proximal hip and LE for functional self-care, mobility, lifting and Adjusted  2. Patient will demonstrate increased AROM  WNL and/or symmetrical to other side to allow for proper joint functioning as indicated by patients Functional Deficits. []? Progressing: []? Met: []? Not Met: []? Adjusted  3. Patient will demonstrate an increase in Strength to good proximal hip strength and control, within 5lb HHD in LE to allow for proper functional mobility as indicated by patients Functional Deficits. []? Progressing: []? Met: []? Not Met: []? Adjusted  4. Patient will return to ambulation for at least 1 hour without AD, pain or limping and a normal reciprocal gait pattern. []? Progressing: []? Met: []? Not Met: []? Adjusted  5. Pt brodie to squat and  toddler grandchild with good mechanics and without pain. []? Progressing: []? Met: []? Not Met: []? Adjusted    Progression Towards Functional goals:  [] Patient is progressing as expected towards functional goals listed. [] Progression is slowed due to complexities listed. [] Progression has been slowed due to co-morbidities. [x] Plan just implemented, too soon to assess goals progression  [] Other:         Overall Progression Towards Functional goals/ Treatment Progress Update:  [] Patient is progressing as expected towards functional goals listed. [] Progression is slowed due to complexities/Impairments listed. [] Progression has been slowed due to co-morbidities.   [x] Plan just implemented, too soon to assess goals progression <30days   [] Goals require adjustment due to lack of progress  [] Patient is not progressing as expected and requires additional follow up with physician  [] Other    Prognosis for POC: [x] Good [] Fair  [] Poor      Patient requires continued skilled intervention: [x] Yes  [] No    Treatment/Activity Tolerance:  [x] Patient able to complete treatment  [] Patient limited by fatigue  [] Patient limited by pain    [] Patient limited by other medical complications  [] Other:     ASSESSMENT: Pt reports that the stretching and ROM felt good today. She needed cueing to control torso lean compensating for Trendelenberg with L SLS activities today. PLAN: See shirin. Pt to be seen 2xwk for 8 wks. Next visit: progress HEP, calf raises, bwd walking to regain hip ext. [x] Continue per plan of care [] Alter current plan (see comments above)  [] Plan of care initiated [] Hold pending MD visit [] Discharge      Electronically signed by:  Armand Sexton, PT    Note: If patient does not return for scheduled/ recommended follow up visits, this note will serve as a discharge from care along with most recent update on progress.

## 2020-09-28 ENCOUNTER — HOSPITAL ENCOUNTER (OUTPATIENT)
Dept: PHYSICAL THERAPY | Age: 60
Setting detail: THERAPIES SERIES
Discharge: HOME | End: 2020-09-28
Payer: COMMERCIAL

## 2020-09-28 PROCEDURE — 97110 THERAPEUTIC EXERCISES: CPT

## 2020-09-28 PROCEDURE — 97140 MANUAL THERAPY 1/> REGIONS: CPT

## 2020-09-28 NOTE — FLOWSHEET NOTE
[facility] - orthopaedics and sports medicine; [facility]     Physical Therapy Treatment Note/ Progress Report:           Date:  2020    Patient Name:  Crispin Taylor    :  1960  MRN: [id number]  Restrictions/Precautions:    Medical/Treatment Diagnosis Information:  Diagnosis: X23.847 presence of artificial left hip  Treatment Diagnosis: M25.652 L hip stiffness, R26.2 difficulty walking, M62.82 L hip weakness, M25.462 L hip pain  Insurance/Certification information:  PT Insurance Information: [employer], 60 visits comb, needs auth  Physician Information:  Referring Practitioner: Dr. Masha Moon  Has the plan of care been signed (Y/N):        []  Yes  [x]  No     Date of Patient follow up with Physician: mid-Oct.    Surgery date: 2020    Is this a Progress Report:     []  Yes  [x]  No        If Yes:  Date Range for reporting period:  Beginning 2020  Ending      Progress report will be due (10 Rx or 30 days whichever is less): 76/10/9609      Recertification will be due (POC Duration  / 90 days whichever is less): 2020      Visit # Insurance Allowable Requires auth   3 ? []no        [x]yes:     Functional Scale: LEFS: 47/80 = 59% ability, 49% disability   Date assessed:  2020          Number of Comorbidities:  [x]0     []1-2    []3+    Latex Allergy:  [x]NO      []YES  Preferred Language for Healthcare:   [x]English       []other:      Pain level:  2/10     SUBJECTIVE:  Pt reports She was able to walk 1 mile yesterday but reports she did trip and fall but landed mostly  On her R shoulder which is sore but she doesn't think she hurt anything. She says she has been feeling really good since last visit. She has been able to sleep through the night which is much better. The biggest thing that still bother her is trying to pick the leg up on its own power to put on pants or to get in and out of the car. OBJECTIVE:    Observation: mild tenderness still over the anterior L hip incision. the patient for proper LE, core and proximal hip recruitment and positioning and eccentric body weight control with ambulation re-education including up and down stairs     Home Exercise Program:    [x] (60944) Reviewed/Progressed HEP activities related to strengthening, flexibility, endurance, ROM of core, proximal hip and LE for functional self-care, mobility, lifting and ambulation/stair navigation   [] (34635)Reviewed/Progressed HEP activities related to improving balance, coordination, kinesthetic sense, posture, motor skill, proprioception of core, proximal hip and LE for self care, mobility, lifting, and ambulation/stair navigation      Manual Treatments:  PROM / STM / Oscillations-Mobs:  G-I, II, III, IV (PA's, Inf., Post.)  [x] (08357) Provided manual therapy to mobilize LE, proximal hip and/or LS spine soft tissue/joints for the purpose of modulating pain, promoting relaxation,  increasing ROM, reducing/eliminating soft tissue swelling/inflammation/restriction, improving soft tissue extensibility and allowing for proper ROM for normal function with self care, mobility, lifting and ambulation. Modalities:     [] GAME READY (VASO)- for significant edema, swelling, pain control. Charges:  Timed Code Treatment Minutes: 48'    Total Treatment Minutes: 61'        [] EVAL (LOW) 455 1011   [] EVAL (MOD) 16808  [] EVAL (HIGH) 95325   [] RE-EVAL      [x] PU(48968) x   3  [] IONTO  [] NMR (18608) x     [] VASO  [x] Manual (81597) x 1     [] Other:  [] TA x      [] Mech Traction (47660)  [] ES(attended) (05648)      [] ES (un) (74975):       GOALS:   Patient stated goal: Get back to full activity level and better sleep. []? Progressing: []? Met: []? Not Met: []? Adjusted     Therapist goals for Patient:   Short Term Goals: To be achieved in: 2 weeks  1. Independent in HEP and progression per patient tolerance, in order to prevent re-injury. []? Progressing: []? Met: []? Not Met: []? Adjusted  2.  Patient will have a decrease in pain to facilitate improvement in movement, function, and ADLs as indicated by Functional Deficits. []? Progressing: []? Met: []? Not Met: []? Adjusted     Long Term Goals: To be achieved in: 8 weeks  1. Disability index score of 10% or less for the LEFS to assist with reaching prior level of function. []? Progressing: []? Met: []? Not Met: []? Adjusted  2. Patient will demonstrate increased AROM  WNL and/or symmetrical to other side to allow for proper joint functioning as indicated by patients Functional Deficits. []? Progressing: []? Met: []? Not Met: []? Adjusted  3. Patient will demonstrate an increase in Strength to good proximal hip strength and control, within 5lb HHD in LE to allow for proper functional mobility as indicated by patients Functional Deficits. []? Progressing: []? Met: []? Not Met: []? Adjusted  4. Patient will return to ambulation for at least 1 hour without AD, pain or limping and a normal reciprocal gait pattern. []? Progressing: []? Met: []? Not Met: []? Adjusted  5. Pt brodie to squat and  toddler grandchild with good mechanics and without pain. []? Progressing: []? Met: []? Not Met: []? Adjusted    Progression Towards Functional goals:  [] Patient is progressing as expected towards functional goals listed. [] Progression is slowed due to complexities listed. [] Progression has been slowed due to co-morbidities. [x] Plan just implemented, too soon to assess goals progression  [] Other:         Overall Progression Towards Functional goals/ Treatment Progress Update:  [] Patient is progressing as expected towards functional goals listed. [] Progression is slowed due to complexities/Impairments listed. [] Progression has been slowed due to co-morbidities.   [x] Plan just implemented, too soon to assess goals progression <30days   [] Goals require adjustment due to lack of progress  [] Patient is not progressing as expected and requires additional

## 2020-09-30 ENCOUNTER — HOSPITAL ENCOUNTER (OUTPATIENT)
Dept: PHYSICAL THERAPY | Age: 60
Setting detail: THERAPIES SERIES
Discharge: HOME | End: 2020-09-30
Payer: COMMERCIAL

## 2020-09-30 PROCEDURE — 97140 MANUAL THERAPY 1/> REGIONS: CPT

## 2020-09-30 NOTE — FLOWSHEET NOTE
[facility] - orthopaedics and sports medicine; [facility]     Physical Therapy Treatment Note/ Progress Report:           Date:  2020    Patient Name:  Thiago Jacinto    :  1960  MRN: [id number]  Restrictions/Precautions:    Medical/Treatment Diagnosis Information:  Diagnosis: H11.599 presence of artificial left hip  Treatment Diagnosis: M25.652 L hip stiffness, R26.2 difficulty walking, M62.82 L hip weakness, M25.462 L hip pain  Insurance/Certification information:  PT Insurance Information: [employer], 60 visits comb, needs auth  Physician Information:  Referring Practitioner: Dr. Praveen Arce  Has the plan of care been signed (Y/N):        []  Yes  [x]  No     Date of Patient follow up with Physician: mid-Oct.    Surgery date: 2020    Is this a Progress Report:     []  Yes  [x]  No        If Yes:  Date Range for reporting period:  Beginning 2020  Ending      Progress report will be due (10 Rx or 30 days whichever is less):       Recertification will be due (POC Duration  / 90 days whichever is less): 2020      Visit # Insurance Allowable Requires auth   3 ? []no        [x]yes:     Functional Scale: LEFS: 47/80 = 59% ability, 49% disability   Date assessed:  2020          Number of Comorbidities:  [x]0     []1-2    []3+    Latex Allergy:  [x]NO      []YES  Preferred Language for Healthcare:   [x]English       []other:      Pain level:  2/10     SUBJECTIVE:  Pt is leaving for 1 week trip to [place] tomorrow. She feels that the new exercises made her a little sore but recovered quickly and thinks they will be a good challenge for her while she is gone. Today she overall is feeling pretty good but feels really tight in L glutes today. OBJECTIVE:    Observation: mild tenderness still over the anterior L hip incision. Gait mildly antalgic with Trendelenberg pattern and lack of L hip extension.     Test measurements:  Hip flexion: 128 deg, hip abd: 42 deg, Hip ER: 45 deg RESTRICTIONS/PRECAUTIONS: none    Exercises/Interventions:     Therapeutic Ex (51897) Sets/Reps/ sec Notes/CUES   Recum bike AAROM 5 min    Clamshell  HEP 9/21   Supine hip abd AROM  HEP 9/21   Bridge w/ abd band HEP 9/21   Supine clamshell    Mini-squat HEP 9/21   LAQ w/ ball squeeze    Seated bilat hip IR    Standing hip abd & ext    Prone quad stretch  HEP 9/23   Standing hurdler stretch/ROM 2x10    Supine active knee tuck/ leg extension    Step-ups    Lateral step overs    Side stepping              Manual Intervention (90783)     STM/MFR: L ITB, Glutes 25 min    PROM: hip felx, abd, 90/90 ER, CINDY 15 min                        NMR re-education (94919)  CUES NEEDED   SLB  Cued for torso posture                                           Therapeutic Activity (54231)                         Modalities     CP           Therapeutic Exercise and NMR EXR  [] (01401) Provided verbal/tactile cueing for activities related to strengthening, flexibility, endurance, ROM for improvements in LE, proximal hip, and core control with self care, mobility, lifting, ambulation.  [] (16279) Provided verbal/tactile cueing for activities related to improving balance, coordination, kinesthetic sense, posture, motor skill, proprioception  to assist with LE, proximal hip, and core control in self care, mobility, lifting, ambulation and eccentric single leg control.      NMR and Therapeutic Activities:    [] (56538 or 66879) Provided verbal/tactile cueing for activities related to improving balance, coordination, kinesthetic sense, posture, motor skill, proprioception and motor activation to allow for proper function of core, proximal hip and LE with self care and ADLs  [] (13232) Gait Re-education- Provided training and instruction to the patient for proper LE, core and proximal hip recruitment and positioning and eccentric body weight control with ambulation re-education including up and down stairs     Home Exercise Program:    [x] (16252) Reviewed/Progressed HEP activities related to strengthening, flexibility, endurance, ROM of core, proximal hip and LE for functional self-care, mobility, lifting and ambulation/stair navigation   [] (30018)Reviewed/Progressed HEP activities related to improving balance, coordination, kinesthetic sense, posture, motor skill, proprioception of core, proximal hip and LE for self care, mobility, lifting, and ambulation/stair navigation      Manual Treatments:  PROM / STM / Oscillations-Mobs:  G-I, II, III, IV (PA's, Inf., Post.)  [x] (12798) Provided manual therapy to mobilize LE, proximal hip and/or LS spine soft tissue/joints for the purpose of modulating pain, promoting relaxation,  increasing ROM, reducing/eliminating soft tissue swelling/inflammation/restriction, improving soft tissue extensibility and allowing for proper ROM for normal function with self care, mobility, lifting and ambulation. Modalities:     [] GAME READY (VASO)- for significant edema, swelling, pain control. Charges:  Timed Code Treatment Minutes: 39'    Total Treatment Minutes: 39'        [] EVAL (LOW) 455 1011   [] EVAL (MOD) 93251  [] EVAL (HIGH) 98008   [] RE-EVAL      [] CR(40752) x     [] IONTO  [] NMR (92405) x     [] VASO  [x] Manual (85325) x 3     [] Other:  [] TA x      [] Mech Traction (88655)  [] ES(attended) (49683)      [] ES (un) (87685):       GOALS:   Patient stated goal: Get back to full activity level and better sleep. []? Progressing: []? Met: []? Not Met: []? Adjusted     Therapist goals for Patient:   Short Term Goals: To be achieved in: 2 weeks  1. Independent in HEP and progression per patient tolerance, in order to prevent re-injury. []? Progressing: []? Met: []? Not Met: []? Adjusted  2. Patient will have a decrease in pain to facilitate improvement in movement, function, and ADLs as indicated by Functional Deficits. []? Progressing: []? Met: []? Not Met: []? Adjusted     Long Term Goals:  To be achieved in: 8 weeks  1. Disability index score of 10% or less for the LEFS to assist with reaching prior level of function. []? Progressing: []? Met: []? Not Met: []? Adjusted  2. Patient will demonstrate increased AROM  WNL and/or symmetrical to other side to allow for proper joint functioning as indicated by patients Functional Deficits. []? Progressing: []? Met: []? Not Met: []? Adjusted  3. Patient will demonstrate an increase in Strength to good proximal hip strength and control, within 5lb HHD in LE to allow for proper functional mobility as indicated by patients Functional Deficits. []? Progressing: []? Met: []? Not Met: []? Adjusted  4. Patient will return to ambulation for at least 1 hour without AD, pain or limping and a normal reciprocal gait pattern. []? Progressing: []? Met: []? Not Met: []? Adjusted  5. Pt brodie to squat and  toddler grandchild with good mechanics and without pain. []? Progressing: []? Met: []? Not Met: []? Adjusted    Progression Towards Functional goals:  [] Patient is progressing as expected towards functional goals listed. [] Progression is slowed due to complexities listed. [] Progression has been slowed due to co-morbidities. [x] Plan just implemented, too soon to assess goals progression  [] Other:         Overall Progression Towards Functional goals/ Treatment Progress Update:  [] Patient is progressing as expected towards functional goals listed. [] Progression is slowed due to complexities/Impairments listed. [] Progression has been slowed due to co-morbidities.   [x] Plan just implemented, too soon to assess goals progression <30days   [] Goals require adjustment due to lack of progress  [] Patient is not progressing as expected and requires additional follow up with physician  [] Other    Prognosis for POC: [x] Good [] Fair  [] Poor      Patient requires continued skilled intervention: [x] Yes  [] No    Treatment/Activity Tolerance:  [x] Patient able to complete treatment  [] Patient limited by fatigue  [] Patient limited by pain    [] Patient limited by other medical complications  [] Other:     ASSESSMENT: Pt's ROM continues to progress in all directions and also less painful. Her ROm is approaching WNL. She reported feeling much better after the STM and PROM and had improved gait with less Trendelenberg but still lack of full hip extension in terminal stance on the L side. PLAN: See eval. Pt to be seen 2xwk for 8 wks. Next visit: calf raises, bwd walking to regain hip ext., Standing hip flexor stretch  [x] Continue per plan of care [] Alter current plan (see comments above)  [] Plan of care initiated [] Hold pending MD visit [] Discharge      Electronically signed by:  Magnus Conrad PT    Note: If patient does not return for scheduled/ recommended follow up visits, this note will serve as a discharge from care along with most recent update on progress.

## 2020-10-12 ENCOUNTER — HOSPITAL ENCOUNTER (OUTPATIENT)
Dept: PHYSICAL THERAPY | Age: 60
Setting detail: THERAPIES SERIES
Discharge: HOME | End: 2020-10-12
Payer: COMMERCIAL

## 2020-10-12 PROCEDURE — 97110 THERAPEUTIC EXERCISES: CPT

## 2020-10-12 PROCEDURE — 97140 MANUAL THERAPY 1/> REGIONS: CPT

## 2020-10-12 PROCEDURE — 97112 NEUROMUSCULAR REEDUCATION: CPT

## 2020-10-12 NOTE — FLOWSHEET NOTE
(37103) Sets/Reps/ sec Notes/CUES   Recum bike AAROM 5 min    Clamshell Blue 2x15 L HEP 9/21, 10/12   Supine hip abd AROM  HEP 9/21   Bridge w/ march  [id number] 9/21, 10/12   Supine clamshell Blue, 2x15   Mini-squat HEP 9/21   LAQ w/ ball squeeze    Seated bilat hip IR    Standing hip abd & ext    Prone quad stretch  HEP 9/23   Standing hurdler stretch/ROM     Supine active knee tuck/ leg extension    Step-ups    Lateral step overs    Side stepping              Manual Intervention (61385)     STM/MFR: L prox thigh 6 min    PROM: hip felx, abd, 90/90 ER, CINDY 10 min                        NMR re-education (32605)  CUES NEEDED   SLB  Cued for torso posture   SLB airex 2x30\" L    Steamboats, L stance Green 2x10 ea HEP 10/12   Gait training: Fwd/Bwd walking 3 min Cued for use of full hip ext; HEP 10/12                            Therapeutic Activity (01648)                         Modalities     CP 10 min          Therapeutic Exercise and NMR EXR  [x] (36400) Provided verbal/tactile cueing for activities related to strengthening, flexibility, endurance, ROM for improvements in LE, proximal hip, and core control with self care, mobility, lifting, ambulation.  [] (61830) Provided verbal/tactile cueing for activities related to improving balance, coordination, kinesthetic sense, posture, motor skill, proprioception  to assist with LE, proximal hip, and core control in self care, mobility, lifting, ambulation and eccentric single leg control.      NMR and Therapeutic Activities:    [x] (46666 or 42744) Provided verbal/tactile cueing for activities related to improving balance, coordination, kinesthetic sense, posture, motor skill, proprioception and motor activation to allow for proper function of core, proximal hip and LE with self care and ADLs  [x] (99502) Gait Re-education- Provided training and instruction to the patient for proper LE, core and proximal hip recruitment and positioning and eccentric body weight control with ambulation re-education including up and down stairs     Home Exercise Program:    [x] (25542) Reviewed/Progressed HEP activities related to strengthening, flexibility, endurance, ROM of core, proximal hip and LE for functional self-care, mobility, lifting and ambulation/stair navigation   [] (01770)Reviewed/Progressed HEP activities related to improving balance, coordination, kinesthetic sense, posture, motor skill, proprioception of core, proximal hip and LE for self care, mobility, lifting, and ambulation/stair navigation      Manual Treatments:  PROM / STM / Oscillations-Mobs:  G-I, II, III, IV (PA's, Inf., Post.)  [x] (59494) Provided manual therapy to mobilize LE, proximal hip and/or LS spine soft tissue/joints for the purpose of modulating pain, promoting relaxation,  increasing ROM, reducing/eliminating soft tissue swelling/inflammation/restriction, improving soft tissue extensibility and allowing for proper ROM for normal function with self care, mobility, lifting and ambulation. Modalities:     [] GAME READY (VASO)- for significant edema, swelling, pain control. CP to address pain and inflammation x 10 min    Charges:  Timed Code Treatment Minutes: 39'    Total Treatment Minutes: 54'        [] EVAL (LOW) 49782   [] EVAL (MOD) 88001  [] EVAL (HIGH) 24271   [] RE-EVAL      [x] VO(23761) x  1   [] IONTO  [x] NMR (54833) x   1  [] VASO  [x] Manual (62532) x 1    [] Other:  [] TA x      [] Mech Traction (50563)  [] ES(attended) (55453)      [] ES (un) (44442):       GOALS:   Patient stated goal: Get back to full activity level and better sleep. [x]? Progressing: []? Met: []? Not Met: []? Adjusted     Therapist goals for Patient:   Short Term Goals: To be achieved in: 2 weeks  1. Independent in HEP and progression per patient tolerance, in order to prevent re-injury. []? Progressing: [x]? Met: []? Not Met: []? Adjusted  2.  Patient will have a decrease in pain to facilitate improvement in movement, function, and ADLs as indicated by Functional Deficits. [x]? Progressing: []? Met: []? Not Met: []? Adjusted     Long Term Goals: To be achieved in: 8 weeks  1. Disability index score of 10% or less for the LEFS to assist with reaching prior level of function. []? Progressing: []? Met: []? Not Met: []? Adjusted  2. Patient will demonstrate increased AROM  WNL and/or symmetrical to other side to allow for proper joint functioning as indicated by patients Functional Deficits. [x]? Progressing:see ROM above[]? Met: []? Not Met: []? Adjusted  3. Patient will demonstrate an increase in Strength to good proximal hip strength and control, within 5lb HHD in LE to allow for proper functional mobility as indicated by patients Functional Deficits. []? Progressing: []? Met: []? Not Met: []? Adjusted  4. Patient will return to ambulation for at least 1 hour without AD, pain or limping and a normal reciprocal gait pattern. [x]? Progressing: []? Met: []? Not Met: []? Adjusted  5. Pt brodie to squat and  toddler grandchild with good mechanics and without pain. []? Progressing: []? Met: []? Not Met: []? Adjusted    Progression Towards Functional goals:  [x] Patient is progressing as expected towards functional goals listed. [] Progression is slowed due to complexities listed. [] Progression has been slowed due to co-morbidities. [] Plan just implemented, too soon to assess goals progression  [] Other:         Overall Progression Towards Functional goals/ Treatment Progress Update:  [x] Patient is progressing as expected towards functional goals listed. [] Progression is slowed due to complexities/Impairments listed. [] Progression has been slowed due to co-morbidities.   [] Plan just implemented, too soon to assess goals progression <30days   [] Goals require adjustment due to lack of progress  [] Patient is not progressing as expected and requires additional follow up with physician  [] Other    Prognosis for POC: [x] Good [] Fair  [] Poor      Patient requires continued skilled intervention: [x] Yes  [] No    Treatment/Activity Tolerance:  [x] Patient able to complete treatment  [] Patient limited by fatigue  [] Patient limited by pain    [] Patient limited by other medical complications  [] Other:     ASSESSMENT: Pt improved in some hip abd ROM today , gait is nearly normalized with lessTrendelenburg and after cueing today improving use of hip extension in terminal stance on L LE. Pt's primary limitation is muscle strength and endurance at this point. PLAN: See eval. Pt to be seen 2xwk for 8 wks. Next visit: calf raises, Standing hip flexor stretch, mini-side lunge, cone step overs, LAQ machine, leg press    [x] Continue per plan of care [] Alter current plan (see comments above)  [] Plan of care initiated [] Hold pending MD visit [] Discharge      Electronically signed by:  Kavin Mobley, PT    Note: If patient does not return for scheduled/ recommended follow up visits, this note will serve as a discharge from care along with most recent update on progress.

## 2020-10-14 ENCOUNTER — HOSPITAL ENCOUNTER (OUTPATIENT)
Dept: PHYSICAL THERAPY | Age: 60
Setting detail: THERAPIES SERIES
Discharge: HOME | End: 2020-10-14
Payer: COMMERCIAL

## 2020-10-14 PROCEDURE — 97140 MANUAL THERAPY 1/> REGIONS: CPT

## 2020-10-14 PROCEDURE — 97110 THERAPEUTIC EXERCISES: CPT

## 2020-10-14 NOTE — FLOWSHEET NOTE
[facility] - orthopaedics and sports medicine; [facility]     Physical Therapy Treatment Note/ Progress Report:           Date:  10/14/2020    Patient Name:  Thiago Jacinto    :  1960  MRN: [id number]  Restrictions/Precautions:    Medical/Treatment Diagnosis Information:  Diagnosis: B86.611 presence of artificial left hip  Treatment Diagnosis: M25.652 L hip stiffness, R26.2 difficulty walking, M62.82 L hip weakness, M25.462 L hip pain  Insurance/Certification information:  PT Insurance Information: [employer], 60 visits comb, needs auth  Physician Information:  Referring Practitioner: Dr. Praveen Arce  Has the plan of care been signed (Y/N):        []  Yes  [x]  No     Date of Patient follow up with Physician: mid-Oct.    Surgery date: 2020    Is this a Progress Report:     []  Yes  [x]  No        If Yes:  Date Range for reporting period:  Beginning 2020  Ending      Progress report will be due (10 Rx or 30 days whichever is less):       Recertification will be due (POC Duration  / 90 days whichever is less): 2020      Visit # Insurance Allowable Requires auth   5 ? []no        [x]yes:     Functional Scale: LEFS: 47/80 = 59% ability, 49% disability   Date assessed:  2020          Number of Comorbidities:  [x]0     []1-2    []3+    Latex Allergy:  [x]NO      []YES  Preferred Language for Healthcare:   [x]English       []other:      Pain level:  0/10     SUBJECTIVE:  Pt says she is a little sore after the exercise progress last visit compounded by sitting awkwardly on the floor painting with her daughter. She has been also working on lengthening her stride as instructed an she is starting to notice her walking feeling more natural.     OBJECTIVE:    Observation: mild tenderness still over the anterior L hip incision. Gait nearly normalized.    Test measurements:  Hip flexion: 128 deg, hip abd: 45 deg, Hip ER: 45 deg     RESTRICTIONS/PRECAUTIONS: none    Exercises/Interventions: Therapeutic Ex (19940) Sets/Reps/ sec Notes/CUES   Recum bike AAROM     Clamshell  HEP 9/21, 10/12   Supine hip abd AROM  HEP 9/21   Bridge w/ march  HEP 9/21, 10/12   Supine clamshell Blue, 2x15   Mini-squat HEP 9/21   Sit<>Stand x15   LAQ w/ ball squeeze    Seated bilat hip IR    Standing hip abd & ext    Prone quad stretch  HEP 9/23   Standing hurdler stretch/ROM     Supine active knee tuck/ leg extension    Step-ups    Lateral step overs    Side stepping    S/L leg circles 2x10 CW/CCW L    Leg press 80 lbs DL 2x15, 60 lbs L 2x15 [address] [place] [address] & [facility] machine: hip ext 60lbs 2x15 R/L    Standing hip flexor stretch 2x30\"              Manual Intervention (94343)     STM/MFR: L prox thigh 6 min    PROM: hip felx, abd, 90/90 ER, CINDY 15 min                        NMR re-education (04539)  CUES NEEDED   SLB  Cued for torso posture   SLB airex 2x30\" L    Steamboats, L stance  HEP 10/12   Gait training: Fwd/Bwd walking  Cued for use of full hip ext; HEP 10/12   SLB turn head 2x30\" L                        Therapeutic Activity (46987)                         Modalities     CP  declined         Therapeutic Exercise and NMR EXR  [x] (68528) Provided verbal/tactile cueing for activities related to strengthening, flexibility, endurance, ROM for improvements in LE, proximal hip, and core control with self care, mobility, lifting, ambulation. [x] (22470) Provided verbal/tactile cueing for activities related to improving balance, coordination, kinesthetic sense, posture, motor skill, proprioception  to assist with LE, proximal hip, and core control in self care, mobility, lifting, ambulation and eccentric single leg control.      NMR and Therapeutic Activities:    [] (51261 or 71959) Provided verbal/tactile cueing for activities related to improving balance, coordination, kinesthetic sense, posture, motor skill, proprioception and motor activation to allow for proper function of core, proximal hip and LE with self care and ADLs  [] (94255) Gait Re-education- Provided training and instruction to the patient for proper LE, core and proximal hip recruitment and positioning and eccentric body weight control with ambulation re-education including up and down stairs     Home Exercise Program:    [] (55059) Reviewed/Progressed HEP activities related to strengthening, flexibility, endurance, ROM of core, proximal hip and LE for functional self-care, mobility, lifting and ambulation/stair navigation   [] (10081)Reviewed/Progressed HEP activities related to improving balance, coordination, kinesthetic sense, posture, motor skill, proprioception of core, proximal hip and LE for self care, mobility, lifting, and ambulation/stair navigation      Manual Treatments:  PROM / STM / Oscillations-Mobs:  G-I, II, III, IV (PA's, Inf., Post.)  [x] (46871) Provided manual therapy to mobilize LE, proximal hip and/or LS spine soft tissue/joints for the purpose of modulating pain, promoting relaxation,  increasing ROM, reducing/eliminating soft tissue swelling/inflammation/restriction, improving soft tissue extensibility and allowing for proper ROM for normal function with self care, mobility, lifting and ambulation. Modalities:     [] GAME READY (VASO)- for significant edema, swelling, pain control. Charges:  Timed Code Treatment Minutes: 39'    Total Treatment Minutes: 54'        [] EVAL (LOW) 455 1011   [] EVAL (MOD) 55715  [] EVAL (HIGH) 67789   [] RE-EVAL      [x] YG(71306) x  2   [] IONTO  [] NMR (74156) x     [] VASO  [x] Manual (80544) x 1    [] Other:  [] TA x      [] Mech Traction (82323)  [] ES(attended) (51518)      [] ES (un) (61339):       GOALS:   Patient stated goal: Get back to full activity level and better sleep. [x]? Progressing: []? Met: []? Not Met: []? Adjusted     Therapist goals for Patient:   Short Term Goals: To be achieved in: 2 weeks  1.  Independent in HEP and progression per patient tolerance, in order to prevent re-injury. []? Progressing: [x]? Met: []? Not Met: []? Adjusted  2. Patient will have a decrease in pain to facilitate improvement in movement, function, and ADLs as indicated by Functional Deficits. [x]? Progressing: []? Met: []? Not Met: []? Adjusted     Long Term Goals: To be achieved in: 8 weeks  1. Disability index score of 10% or less for the LEFS to assist with reaching prior level of function. []? Progressing: []? Met: []? Not Met: []? Adjusted  2. Patient will demonstrate increased AROM  WNL and/or symmetrical to other side to allow for proper joint functioning as indicated by patients Functional Deficits. [x]? Progressing:see ROM above[]? Met: []? Not Met: []? Adjusted  3. Patient will demonstrate an increase in Strength to good proximal hip strength and control, within 5lb HHD in LE to allow for proper functional mobility as indicated by patients Functional Deficits. []? Progressing: []? Met: []? Not Met: []? Adjusted  4. Patient will return to ambulation for at least 1 hour without AD, pain or limping and a normal reciprocal gait pattern. [x]? Progressing: []? Met: []? Not Met: []? Adjusted  5. Pt brodie to squat and  toddler grandchild with good mechanics and without pain. []? Progressing: []? Met: []? Not Met: []? Adjusted    Progression Towards Functional goals:  [x] Patient is progressing as expected towards functional goals listed. [] Progression is slowed due to complexities listed. [] Progression has been slowed due to co-morbidities. [] Plan just implemented, too soon to assess goals progression  [] Other:         Overall Progression Towards Functional goals/ Treatment Progress Update:  [x] Patient is progressing as expected towards functional goals listed. [] Progression is slowed due to complexities/Impairments listed. [] Progression has been slowed due to co-morbidities.   [] Plan just implemented, too soon to assess goals progression <30days   [] Goals require adjustment due to lack of progress  [] Patient is not progressing as expected and requires additional follow up with physician  [] Other    Prognosis for POC: [x] Good [] Fair  [] Poor      Patient requires continued skilled intervention: [x] Yes  [] No    Treatment/Activity Tolerance:  [x] Patient able to complete treatment  [] Patient limited by fatigue  [] Patient limited by pain    [] Patient limited by other medical complications  [] Other:     ASSESSMENT: Pt achieved normal ROM in all three directions today without pain. Her use of hip extension is improving in her gait. She is also showing some improvement functionally in strength as able to do more higher resistance work. PLAN: See eval. Pt to be seen 2xwk for 8 wks. Next visit: mini-side lunge, cone step overs, LAQ machine, progress HEP    [x] Continue per plan of care [] Alter current plan (see comments above)  [] Plan of care initiated [] Hold pending MD visit [] Discharge      Electronically signed by:  Niya Tolliver, PT    Note: If patient does not return for scheduled/ recommended follow up visits, this note will serve as a discharge from care along with most recent update on progress.

## 2020-10-19 ENCOUNTER — HOSPITAL ENCOUNTER (OUTPATIENT)
Dept: PHYSICAL THERAPY | Age: 60
Setting detail: THERAPIES SERIES
Discharge: HOME | End: 2020-10-19
Payer: COMMERCIAL

## 2020-10-19 PROCEDURE — 97110 THERAPEUTIC EXERCISES: CPT

## 2020-10-19 NOTE — PROGRESS NOTES
[facility] - orthopaedics and sports medicine; [facility]     Physical Therapy Treatment Note/ Progress Report:           Date:  10/19/2020    Patient Name:  Moira Coel    :  1960  MRN: [id number]  Restrictions/Precautions:    Medical/Treatment Diagnosis Information:  Diagnosis: L27.289 presence of artificial left hip  Treatment Diagnosis: M25.652 L hip stiffness, R26.2 difficulty walking, M62.82 L hip weakness, M25.462 L hip pain  Insurance/Certification information:  PT Insurance Information: [employer], 60 visits comb, needs auth  Physician Information:  Referring Practitioner: Dr. Teresa Serrato  Has the plan of care been signed (Y/N):        []  Yes  [x]  No     Date of Patient follow up with Physician: mid-Oct.    Surgery date: 2020    Is this a Progress Report:     [x]  Yes  []  No        If Yes:  Date Range for reporting period:  Beginning 2020  Ending 10/19/20      Progress report will be due (10 Rx or 30 days whichever is less):        Recertification will be due (POC Duration  / 90 days whichever is less): 2020      Visit # Insurance Allowable Requires auth   6 60 visits comb    []no        [x]yes:     Functional Scale: LEFS: 47/80 = 59% ability, 49% disability   Date assessed:  2020         LEFS: 74/80 = 93% ability, 7% disability on 10/19/20      Number of Comorbidities:  [x]0     []1-2    []3+    Latex Allergy:  [x]NO      []YES  Preferred Language for Healthcare:   [x]English       []other:      Pain level:  0/10     SUBJECTIVE:  Pt says depending on what she's been doing and if she has irritated her hip or not the HEP can be either difficult or easy. She feels a little stiff today because this weekend she visited friends and slept on a very bad mattress.  Pt overall feels 85-90% improved, she can now turn over in bed, get in and out of car, walking is better for basic ADLs but still limited to about 1 mile and while getting easier to get up and down from floor it is still a bit of an issue. OBJECTIVE:    Observation: Gait normalized, no specific tenderness.  Test measurements:     PROM Hip flexion: 128 deg, hip abd: 45 deg, Hip ER: 45 deg, hip ext 10 deg    MMT: Hip flex 4+/5 , Hip abd 4/5, Hip ER 4+/5, knee ext 5-/5, knee flex 5/5    RESTRICTIONS/PRECAUTIONS: none    Exercises/Interventions:      Therapeutic Ex (19210) Sets/Reps/ sec Notes/CUES   Recum bike AAROM 5 min    Clamshell w/ feet up 2x15 L/R HEP 9/21, 10/12   Supine hip abd AROM  HEP 9/21   Bridge w/ march  3s24LIC 9/21, 10/12   Supine clamshell    Mini-squat HEP 9/21   Sit<>Stand 2x15, 6lbs med ball   LAQ w/ ball squeeze    Seated bilat hip IR    Standing hip abd & ext    Prone quad stretch  HEP 9/23   Standing hurdler stretch/ROM     Supine active knee tuck/ leg extension    Step-ups    Lateral step overs    Side stepping Green 2x15ft   S/L leg circles 2x10 CW/CCW L    Leg press 80 lbs DL 2x15, 60 lbs L 2x15 [address] [place] [address] [facility] machine: hip ext 60lbs 2x15 R/L    Standing hip flexor stretch 2x30\"    Side lunge 1x10 alteranting    Cone step-overs 10\" 1x10                   Manual Intervention (76844)     STM/MFR: L prox thigh    PROM: hip felx, abd, 90/90 ER, CINDY                        NMR re-education (06788)  CUES NEEDED   SLB  Cued for torso posture   SLB airex 2x30\" L    Steamboats, L stance  HEP 10/12   Gait training: Fwd/Bwd walking  Cued for use of full hip ext; HEP 10/12   SLB turn head     SLB torso rotation 6lbs med ball 2x30\"                   Therapeutic Activity (42392)                         Modalities     CP  declined         Therapeutic Exercise and NMR EXR  [x] (63157) Provided verbal/tactile cueing for activities related to strengthening, flexibility, endurance, ROM for improvements in LE, proximal hip, and core control with self care, mobility, lifting, ambulation.   [x] (44144) Provided verbal/tactile cueing for activities related to improving balance, coordination, kinesthetic sense, posture, (12677) x     [] VASO  [] Manual (19537) x     [] Other:  [] TA x      [] Mech Traction (20221)  [] ES(attended) (94816)      [] ES (un) (30136):       GOALS:   Patient stated goal: Get back to full activity level and better sleep. [x]? Progressing: []? Met: []? Not Met: []? Adjusted     Therapist goals for Patient:   Short Term Goals: To be achieved in: 2 weeks  1. Independent in HEP and progression per patient tolerance, in order to prevent re-injury. []? Progressing: [x]? Met: []? Not Met: []? Adjusted  2. Patient will have a decrease in pain to facilitate improvement in movement, function, and ADLs as indicated by Functional Deficits. []? Progressing: [x]? Met: []? Not Met: []? Adjusted     Long Term Goals: To be achieved in: 8 weeks  1. Disability index score of 10% or less for the LEFS to assist with reaching prior level of function. []? Progressing: [x]? Met: 7% disability on 10/19/20 []? Not Met: []? Adjusted  2. Patient will demonstrate increased AROM  WNL and/or symmetrical to other side to allow for proper joint functioning as indicated by patients Functional Deficits. []? Progressing:[x]? Met:see ROM above []? Not Met: []? Adjusted  3. Patient will demonstrate an increase in Strength to good proximal hip strength and control, within 5lb HHD in LE to allow for proper functional mobility as indicated by patients Functional Deficits. [x]? Progressing: []? Met: []? Not Met: []? Adjusted  4. Patient will return to ambulation for at least 1 hour without AD, pain or limping and a normal reciprocal gait pattern. [x]? Progressing: []? Met: [x]? Not Met: []? Adjusted  5. Pt brodie to squat and  toddler grandchild with good mechanics and without pain. []? Progressing: []? Met: [x]? Not Met: []? Adjusted    Progression Towards Functional goals:  [x] Patient is progressing as expected towards functional goals listed. [] Progression is slowed due to complexities listed.   [] Progression has been slowed due to co-morbidities. [] Plan just implemented, too soon to assess goals progression  [] Other:         Overall Progression Towards Functional goals/ Treatment Progress Update:  [x] Patient is progressing as expected towards functional goals listed. [] Progression is slowed due to complexities/Impairments listed. [] Progression has been slowed due to co-morbidities. [] Plan just implemented, too soon to assess goals progression <30days   [] Goals require adjustment due to lack of progress  [] Patient is not progressing as expected and requires additional follow up with physician  [] Other    Prognosis for POC: [x] Good [] Fair  [] Poor      Patient requires continued skilled intervention: [x] Yes  [] No    Treatment/Activity Tolerance:  [x] Patient able to complete treatment  [] Patient limited by fatigue  [] Patient limited by pain    [] Patient limited by other medical complications  [] Other:     ASSESSMENT: Pt is making good progress in PT. She has normalized her hip ROM, normalized gait mechanics, reduced pain and is making progress in her strength. Strength and muscle endurance are her remaining primary deficits that would benefit from continuing PT. PLAN: See eval. Pt to be seen 2xwk for 8 wks. Next visit: Kelly Cushing, progress HEP    [x] Continue per plan of care [] Alter current plan (see comments above)  [] Plan of care initiated [] Hold pending MD visit [] Discharge      Electronically signed by:  Jay Quan PT    Note: If patient does not return for scheduled/ recommended follow up visits, this note will serve as a discharge from care along with most recent update on progress.

## 2020-10-21 ENCOUNTER — HOSPITAL ENCOUNTER (OUTPATIENT)
Dept: PHYSICAL THERAPY | Age: 60
Setting detail: THERAPIES SERIES
Discharge: HOME | End: 2020-10-21
Payer: COMMERCIAL

## 2020-10-21 PROCEDURE — 97110 THERAPEUTIC EXERCISES: CPT

## 2020-10-21 PROCEDURE — 97140 MANUAL THERAPY 1/> REGIONS: CPT

## 2020-10-21 NOTE — FLOWSHEET NOTE
[facility] - orthopaedics and sports medicine; [facility]     Physical Therapy Treatment Note/ Progress Report:           Date:  10/21/2020    Patient Name:  Radha Roberts    :  1960  MRN: [id number]  Restrictions/Precautions:    Medical/Treatment Diagnosis Information:  Diagnosis: M76.422 presence of artificial left hip  Treatment Diagnosis: M25.652 L hip stiffness, R26.2 difficulty walking, M62.82 L hip weakness, M25.462 L hip pain  Insurance/Certification information:  PT Insurance Information: [employer], 60 visits comb, needs auth  Physician Information:  Referring Practitioner: Dr. Clayton Fletcher  Has the plan of care been signed (Y/N):        []  Yes  [x]  No     Date of Patient follow up with Physician: mid-Oct.    Surgery date: 2020    Is this a Progress Report:     []  Yes  [x]  No        If Yes:  Date Range for reporting period:  Beginning 10/19/2020  Ending 10/19/20      Progress report will be due (10 Rx or 30 days whichever is less):        Recertification will be due (POC Duration  / 90 days whichever is less): 2020      Visit # Insurance Allowable Requires auth   7 60 visits comb    []no        [x]yes:     Functional Scale: LEFS: 47/80 = 59% ability, 49% disability   Date assessed:  2020         LEFS: 74/80 = 93% ability, 7% disability on 10/19/20      Number of Comorbidities:  [x]0     []1-2    []3+    Latex Allergy:  [x]NO      []YES  Preferred Language for Healthcare:   [x]English       []other:      Pain level:  0/10     SUBJECTIVE:  Pt says during the day she continues to see improvement in her function and little pain however at tonight she is still having a lot of trouble sleeping on her side which she prefers and wonders if that is normal 2 months out, I assure her that it is and that takes a good bit of time to resolve. We also discussed about use of pillows and adequate support to help.      OBJECTIVE:    Observation: Gait normalized, no specific tenderness.  Test measurements:     PROM Hip flexion: 128 deg, hip abd: 45 deg, Hip ER: 45 deg, hip ext 10 deg        RESTRICTIONS/PRECAUTIONS: none    Exercises/Interventions:      Therapeutic Ex (88380) Sets/Reps/ sec Notes/CUES   Recum bike AAROM 5 min    Clamshell w/ feet up 2x15 L/R HEP 9/21, 10/12   Supine hip abd AROM  HEP 9/21   Bridge w/ march  HEP 9/21, 10/12   Supine clamshell    Mini-squat HEP 9/21   Sit<>Stand 2x15, 6lbs med ball   LAQ w/ ball squeeze    Seated bilat hip IR    Standing hip abd & ext    Prone quad stretch  HEP 9/23   Standing hurdler stretch/ROM     Supine active knee tuck/ leg extension    Step-ups    Lateral step overs    Side stepping Green 1x15ft   S/L leg circles 2x10 CW/CCW L    Leg press 100 lbs DL 2x15,   60 lbs L 2x15 [address] [place] [address] & REHABILITATION [place] machine: hip ext 60lbs 2x15 R/L    Standing hip flexor stretch 2x30\"    Side lunge 2x10 alteranting    Cone step-overs 10\" 2x10    Paloff press  Green 2x10 B    Step-up w/ GH ext and high march Green 2x10 R/L 10\"         Manual Intervention (19846)     STM/MFR: L ITB,prox thigh 3 min    PROM: hip felx, abd, 90/90 ER, CINDY 8 min                        NMR re-education (15940)  CUES NEEDED   SLB  Cued for torso posture   SLB airex     Steamboats, L stance  HEP 10/12   Gait training: Fwd/Bwd walking  Cued for use of full hip ext; HEP 10/12   SLB turn head     SLB torso rotation 6lbs med ball 2x30\"                   Therapeutic Activity (49171)                         Modalities     CP 10 min          Therapeutic Exercise and NMR EXR  [x] (23116) Provided verbal/tactile cueing for activities related to strengthening, flexibility, endurance, ROM for improvements in LE, proximal hip, and core control with self care, mobility, lifting, ambulation.   [x] (18128) Provided verbal/tactile cueing for activities related to improving balance, coordination, kinesthetic sense, posture, motor skill, proprioception  to assist with LE, proximal hip, and core control in self care, mobility, lifting, ambulation and eccentric single leg control. NMR and Therapeutic Activities:    [] (25447 or 31858) Provided verbal/tactile cueing for activities related to improving balance, coordination, kinesthetic sense, posture, motor skill, proprioception and motor activation to allow for proper function of core, proximal hip and LE with self care and ADLs  [] (99728) Gait Re-education- Provided training and instruction to the patient for proper LE, core and proximal hip recruitment and positioning and eccentric body weight control with ambulation re-education including up and down stairs     Home Exercise Program:    [] (66946) Reviewed/Progressed HEP activities related to strengthening, flexibility, endurance, ROM of core, proximal hip and LE for functional self-care, mobility, lifting and ambulation/stair navigation   [] (49906)Reviewed/Progressed HEP activities related to improving balance, coordination, kinesthetic sense, posture, motor skill, proprioception of core, proximal hip and LE for self care, mobility, lifting, and ambulation/stair navigation      Manual Treatments:  PROM / STM / Oscillations-Mobs:  G-I, II, III, IV (PA's, Inf., Post.)  [x] (25228) Provided manual therapy to mobilize LE, proximal hip and/or LS spine soft tissue/joints for the purpose of modulating pain, promoting relaxation,  increasing ROM, reducing/eliminating soft tissue swelling/inflammation/restriction, improving soft tissue extensibility and allowing for proper ROM for normal function with self care, mobility, lifting and ambulation. Modalities:     [] GAME READY (VASO)- for significant edema, swelling, pain control.    CP to address pain and inflammation x 10 min    Charges:  Timed Code Treatment Minutes: 48'    Total Treatment Minutes: 61'        [] EVAL (LOW) 17006   [] EVAL (MOD) 90468  [] EVAL (HIGH) 31561   [] RE-EVAL      [x] MO(76022) x  3  [] IONTO  [] NMR (38983) x     [] VASO  [x] Manual ([id number]) x 1    [] Other:  [] TA x      [] Mech Traction (51871)  [] ES(attended) (67542)      [] ES (un) (83726):       GOALS:   Patient stated goal: Get back to full activity level and better sleep. [x]? Progressing: []? Met: []? Not Met: []? Adjusted     Therapist goals for Patient:   Short Term Goals: To be achieved in: 2 weeks  1. Independent in HEP and progression per patient tolerance, in order to prevent re-injury. []? Progressing: [x]? Met: []? Not Met: []? Adjusted  2. Patient will have a decrease in pain to facilitate improvement in movement, function, and ADLs as indicated by Functional Deficits. []? Progressing: [x]? Met: []? Not Met: []? Adjusted     Long Term Goals: To be achieved in: 8 weeks  1. Disability index score of 10% or less for the LEFS to assist with reaching prior level of function. []? Progressing: [x]? Met: 7% disability on 10/19/20 []? Not Met: []? Adjusted  2. Patient will demonstrate increased AROM  WNL and/or symmetrical to other side to allow for proper joint functioning as indicated by patients Functional Deficits. []? Progressing:[x]? Met:see ROM above []? Not Met: []? Adjusted  3. Patient will demonstrate an increase in Strength to good proximal hip strength and control, within 5lb HHD in LE to allow for proper functional mobility as indicated by patients Functional Deficits. [x]? Progressing: []? Met: []? Not Met: []? Adjusted  4. Patient will return to ambulation for at least 1 hour without AD, pain or limping and a normal reciprocal gait pattern. [x]? Progressing: []? Met: [x]? Not Met: []? Adjusted  5. Pt brodie to squat and  toddler grandchild with good mechanics and without pain. []? Progressing: []? Met: [x]? Not Met: []? Adjusted    Progression Towards Functional goals:  [x] Patient is progressing as expected towards functional goals listed. [] Progression is slowed due to complexities listed. [] Progression has been slowed due to co-morbidities.   [] Plan just implemented, too soon to assess goals progression  [] Other:         Overall Progression Towards Functional goals/ Treatment Progress Update:  [x] Patient is progressing as expected towards functional goals listed. [] Progression is slowed due to complexities/Impairments listed. [] Progression has been slowed due to co-morbidities. [] Plan just implemented, too soon to assess goals progression <30days   [] Goals require adjustment due to lack of progress  [] Patient is not progressing as expected and requires additional follow up with physician  [] Other    Prognosis for POC: [x] Good [] Fair  [] Poor      Patient requires continued skilled intervention: [x] Yes  [] No    Treatment/Activity Tolerance:  [x] Patient able to complete treatment  [] Patient limited by fatigue  [] Patient limited by pain    [] Patient limited by other medical complications  [] Other:     ASSESSMENT: Pt's ROM continues to improve and is less painful. She is also making steady progress in use of her motion and strength showing today less hip hike compensation with stepping over 10\" cone today compared to last session. She reports current HEP still feels challenging so decided to keep with it until next week and then progress accordingly. PLAN: See eval. Pt to be seen 2xwk for 8 wks. Next visit:  progress HEP: cone step overs, side lunge, paloff press, SL bridge    [x] Continue per plan of care [] Alter current plan (see comments above)  [] Plan of care initiated [] Hold pending MD visit [] Discharge      Electronically signed by:  Eloina Sanders PT    Note: If patient does not return for scheduled/ recommended follow up visits, this note will serve as a discharge from care along with most recent update on progress.

## 2020-10-26 ENCOUNTER — OFFICE VISIT (OUTPATIENT)
Dept: ORTHOPEDIC SURGERY | Age: 60
End: 2020-10-26

## 2020-10-26 VITALS — BODY MASS INDEX: 23.32 KG/M2 | WEIGHT: 140 LBS | HEIGHT: 65 IN

## 2020-10-26 PROCEDURE — 99024 POSTOP FOLLOW-UP VISIT: CPT | Performed by: ORTHOPAEDIC SURGERY

## 2020-10-26 NOTE — PROGRESS NOTES
History of present illness: The patient returns today after left anterior total hip replacement. She is about 9 weeks postop. Pain control as been satisfactory with oral medications. There have been no fevers or chills.     ]     Physical examination: The incision is clean, dry, and intact with no drainage or signs of infection. There is expected swelling with no evidence of DVT and a negative Homans sign. Neurovascular exam is intact. Leg length is appropriate. Assessment/plan: At this time she is doing quite well. We went over expectations over the next 3 to 6 months. Her strength and endurance and function should continue to improve. I recommend seeing her back and 1 year for an annual checkup or sooner if there is any problems.

## 2020-10-28 ENCOUNTER — HOSPITAL ENCOUNTER (OUTPATIENT)
Dept: PHYSICAL THERAPY | Age: 60
Setting detail: THERAPIES SERIES
Discharge: HOME | End: 2020-10-28
Payer: COMMERCIAL

## 2020-10-28 PROCEDURE — 97110 THERAPEUTIC EXERCISES: CPT

## 2020-10-28 NOTE — FLOWSHEET NOTE
[facility] - orthopaedics and sports medicine; Formerly [facility], [facility]     Physical Therapy Treatment Note/ Progress Report:           Date:  10/28/2020    Patient Name:  Radha Roberts    :  1960  MRN: [id number]  Restrictions/Precautions:    Medical/Treatment Diagnosis Information:  Diagnosis: A67.024 presence of artificial left hip  Treatment Diagnosis: M25.652 L hip stiffness, R26.2 difficulty walking, M62.82 L hip weakness, M25.462 L hip pain  Insurance/Certification information:  PT Insurance Information: [employer], 60 visits comb, needs auth  Physician Information:  Referring Practitioner: Dr. Clayton Fletcher  Has the plan of care been signed (Y/N):        []  Yes  [x]  No     Date of Patient follow up with Physician: mid-Oct.    Surgery date: 2020    Is this a Progress Report:     []  Yes  [x]  No        If Yes:  Date Range for reporting period:  Beginning 10/19/2020  Ending 10/19/20      Progress report will be due (10 Rx or 30 days whichever is less):        Recertification will be due (POC Duration  / 90 days whichever is less): 2020      Visit # Insurance Allowable Requires auth   8 60 visits comb    []no        [x]yes:     Functional Scale: LEFS: 47/80 = 59% ability, 49% disability   Date assessed:  2020         LEFS: 74/80 = 93% ability, 7% disability on 10/19/20      Number of Comorbidities:  [x]0     []1-2    []3+    Latex Allergy:  [x]NO      []YES  Preferred Language for Healthcare:   [x]English       []other:      Pain level:  0/10     SUBJECTIVE:  Pt reports her f/u appt with surgeon went really well and said she doesn't need to return until 1 yr post-op. He also said she could slowly start back into her [employer] classes as well and she is scheduled for her 1st one on Monday. She reports the hip is feeling good and that the HEP is starting to feel easier. OBJECTIVE:    Observation: Gait normalized, no specific tenderness.    Test measurements:     PROM Hip flexion: 128 deg, hip abd: 45 deg, Hip ER: 45 deg, hip ext 10 deg         RESTRICTIONS/PRECAUTIONS: none    Exercises/Interventions:      Therapeutic Ex (60867) Sets/Reps/ sec Notes/CUES   Recum bike AAROM 5 min    Clamshell w/ feet up 2x15 L/R HEP 9/21, 10/12   Supine hip abd AROM  HEP 9/21   Bridge w/ march  HEP 9/21, 10/12   Supine clamshell    Mini-squat HEP 9/21   Sit<>Stand 2x15, 6lbs med ball   LAQ w/ ball squeeze    Seated bilat hip IR    Standing hip abd & ext    Prone quad stretch  HEP 9/23   Standing hurdler stretch/ROM     Supine active knee tuck/ leg extension    Step-ups    Lateral step overs    Side stepping    S/L leg circles 2x10 CW/CCW L    Leg press  [address] [place] [address] & REHABILITATION [place] machine: hip ext 60lbs 2x15 R/L    Standing hip flexor stretch 2x30\"    Side lunge 1x20 alteranting    Cone step-overs 10\" 2x10    Paloff press  Green 2x10 B    Step-up w/ GH ext and high march Green 2x10 R/L 10\"         Manual Intervention (80648)     STM/MFR: L ITB,prox thigh    PROM: hip felx, abd, 90/90 ER, CINDY 3 min                       NMR re-education (46701)  CUES NEEDED   SLB  Cued for torso posture   SLB airex     Steamboats, L stance  HEP 10/12   Gait training: Fwd/Bwd walking  Cued for use of full hip ext; HEP 10/12   SLB turn head     SLB torso rotation                    Therapeutic Activity (45427)                         Modalities     CP           Therapeutic Exercise and NMR EXR  [x] (52828) Provided verbal/tactile cueing for activities related to strengthening, flexibility, endurance, ROM for improvements in LE, proximal hip, and core control with self care, mobility, lifting, ambulation.  [] (04561) Provided verbal/tactile cueing for activities related to improving balance, coordination, kinesthetic sense, posture, motor skill, proprioception  to assist with LE, proximal hip, and core control in self care, mobility, lifting, ambulation and eccentric single leg control.      NMR and Therapeutic Activities:    [] (36399 or 46275) Provided verbal/tactile cueing for activities related to improving balance, coordination, kinesthetic sense, posture, motor skill, proprioception and motor activation to allow for proper function of core, proximal hip and LE with self care and ADLs  [] (52381) Gait Re-education- Provided training and instruction to the patient for proper LE, core and proximal hip recruitment and positioning and eccentric body weight control with ambulation re-education including up and down stairs     Home Exercise Program:    [x] (87634) Reviewed/Progressed HEP activities related to strengthening, flexibility, endurance, ROM of core, proximal hip and LE for functional self-care, mobility, lifting and ambulation/stair navigation   [] (39598)Reviewed/Progressed HEP activities related to improving balance, coordination, kinesthetic sense, posture, motor skill, proprioception of core, proximal hip and LE for self care, mobility, lifting, and ambulation/stair navigation      Manual Treatments:  PROM / STM / Oscillations-Mobs:  G-I, II, III, IV (PA's, Inf., Post.)  [] (34673) Provided manual therapy to mobilize LE, proximal hip and/or LS spine soft tissue/joints for the purpose of modulating pain, promoting relaxation,  increasing ROM, reducing/eliminating soft tissue swelling/inflammation/restriction, improving soft tissue extensibility and allowing for proper ROM for normal function with self care, mobility, lifting and ambulation. Modalities:     [] GAME READY (VASO)- for significant edema, swelling, pain control.    CP to address pain and inflammation x 10 min    Charges:  Timed Code Treatment Minutes: 39'    Total Treatment Minutes: 39'        [] EVAL (LOW) 19995   [] EVAL (MOD) 06330  [] EVAL (HIGH) 96219   [] RE-EVAL      [x] EX(03447) x  3  [] IONTO  [] NMR (48602) x     [] VASO  [] Manual (35646) x     [] Other:  [] TA x      [] Mech Traction (89787)  [] ES(attended) (38017)      [] ES (un) (52579):       GOALS:   Patient stated goal: Get back to full activity level and better sleep. []? Progressing: [x]? Met: []? Not Met: []? Adjusted     Therapist goals for Patient:   Short Term Goals: To be achieved in: 2 weeks  1. Independent in HEP and progression per patient tolerance, in order to prevent re-injury. []? Progressing: [x]? Met: []? Not Met: []? Adjusted  2. Patient will have a decrease in pain to facilitate improvement in movement, function, and ADLs as indicated by Functional Deficits. []? Progressing: [x]? Met: []? Not Met: []? Adjusted     Long Term Goals: To be achieved in: 8 weeks  1. Disability index score of 10% or less for the LEFS to assist with reaching prior level of function. []? Progressing: [x]? Met: 7% disability on 10/19/20 []? Not Met: []? Adjusted  2. Patient will demonstrate increased AROM  WNL and/or symmetrical to other side to allow for proper joint functioning as indicated by patients Functional Deficits. []? Progressing:[x]? Met:see ROM above []? Not Met: []? Adjusted  3. Patient will demonstrate an increase in Strength to good proximal hip strength and control, within 5lb HHD in LE to allow for proper functional mobility as indicated by patients Functional Deficits. [x]? Progressing: []? Met: []? Not Met: []? Adjusted  4. Patient will return to ambulation for at least 1 hour without AD, pain or limping and a normal reciprocal gait pattern. [x]? Progressing: [x]? Met: []? Not Met: []? Adjusted  5. Pt brodie to squat and  toddler grandchild with good mechanics and without pain. [x]? Progressing: []? Met: []? Not Met: []? Adjusted    Progression Towards Functional goals:  [x] Patient is progressing as expected towards functional goals listed. [] Progression is slowed due to complexities listed. [] Progression has been slowed due to co-morbidities.   [] Plan just implemented, too soon to assess goals progression  [] Other:         Overall Progression Towards Functional goals/ Treatment Progress Update:  [x] Patient is progressing as expected towards functional goals listed. [] Progression is slowed due to complexities/Impairments listed. [] Progression has been slowed due to co-morbidities. [] Plan just implemented, too soon to assess goals progression <30days   [] Goals require adjustment due to lack of progress  [] Patient is not progressing as expected and requires additional follow up with physician  [] Other    Prognosis for POC: [x] Good [] Fair  [] Poor      Patient requires continued skilled intervention: [x] Yes  [] No    Treatment/Activity Tolerance:  [x] Patient able to complete treatment  [] Patient limited by fatigue  [] Patient limited by pain    [] Patient limited by other medical complications  [] Other:     ASSESSMENT: Pt's ROM continues to be pain free and WNL. We progressed her HEP so that she can continue to address her strength deficits as well as transitioned from targeted individual muscles to working in more global and functional strengthening pattern combining legs, hips/glutes, and core and multiple directions of movement. PLAN: See shirin. Pt to be seen 2xwk for 8 wks. Next visit:  progress HEP: paloff press with side step, SL bridge, see how Evans Enriquez is going. [x] Continue per plan of care [] Alter current plan (see comments above)  [] Plan of care initiated [] Hold pending MD visit [] Discharge      Electronically signed by:  Fredis Bella PT    Note: If patient does not return for scheduled/ recommended follow up visits, this note will serve as a discharge from care along with most recent update on progress.

## 2020-11-04 ENCOUNTER — APPOINTMENT (OUTPATIENT)
Dept: PHYSICAL THERAPY | Age: 60
End: 2020-11-04
Payer: COMMERCIAL

## 2020-11-11 ENCOUNTER — HOSPITAL ENCOUNTER (OUTPATIENT)
Dept: PHYSICAL THERAPY | Age: 60
Setting detail: THERAPIES SERIES
Discharge: HOME | End: 2020-11-11
Payer: COMMERCIAL

## 2020-11-11 PROCEDURE — 97110 THERAPEUTIC EXERCISES: CPT

## 2020-11-11 NOTE — DISCHARGE SUMMARY
[facility] - orthopaedics and sports medicine; [facility]     Physical Therapy Discharge  Date: 2020        Patient Name:  Adrianna Albarran    :  1960  MRN: [id number]  Medical/Treatment Diagnosis Information:  Diagnosis: C94.738 presence of artificial left hip  Treatment Diagnosis: M25.652 L hip stiffness, R26.2 difficulty walking, M62.82 L hip weakness, M25.462 L hip pain  Insurance/Certification information:  PT Insurance Information: [employer], 60 visits comb, needs auth  Physician Information:  Referring Practitioner: Dr. Maxine Briones  [x] Surgical [] Conservative    Number of Comorbidities:  [x]0     []1-2    []3+  Total number of visits: 10   Reporting Period:   Beginning Date:20   End Date:20    OBJECTIVE  Test used Initial score Discharge Score   Pain Summary VAS 2-3/10 0/10   Functional questionnaire LEFS 49% disability 1% disability   Functional Testing            ROM      Hip flex  105 135 deg   Hip abd  30 40 deg   Hip ER  30 50 deg         Strength      Hip flex   5/5 mild pain hip flexor   Hip abd   5/5   Hip ER   5-/5   Hip IR      Hip ext   5/5   Knee ext   5/5   Knee flex   5/5          Treatment to date:  [x] Therapeutic Exercise    [] Modalities:  [] Therapeutic Activity             []Ultrasound            []Electrical Stimulation  [x] Gait Training     []Cervical Traction    [] Lumbar Traction  [x] Neuromuscular Re-education [x] Cold/hotpack         []Iontophoresis  [x] Instruction in HEP      Other:  [] Manual Therapy                   []                                  []   Assessment:   [x] All Goals were achieved.    [] The following goals were achieved (#'s):   [] The following goals were not achieved for the following reasons:/assessmen of improvement as it relates to each goal:    Plan of Care:  [x] Discharge from Therapy Services due to: goals met and feeling good    Reason for Discharge:   [x] All goals achieved    [] Patient having surgery  [] Physician discontinued therapy  [] Insurance/Financial Limitations [] Patient did not return for follow up visits [] Home program/1 visit only   [] No subjective or objective improvement [] Plateaued   [] Patient was unable to adhere to the plan of care established at evaluation. [] Referred back to physician for re-evaluation and did not return.      [] Other:       Electronically signed by:  Rosemarie Mercedes, PT        Physical Therapy Treatment Note/ Progress Report:           Date:  2020    Patient Name:  Adrianna Albarran    :  1960  MRN: [id number]  Restrictions/Precautions:    Medical/Treatment Diagnosis Information:  Diagnosis: R88.780 presence of artificial left hip  Treatment Diagnosis: M25.652 L hip stiffness, R26.2 difficulty walking, M62.82 L hip weakness, M25.462 L hip pain  Insurance/Certification information:  PT Insurance Information: [employer], 60 visits comb, needs auth  Physician Information:  Referring Practitioner: Dr. Maxine Briones  Has the plan of care been signed (Y/N):        []  Yes  [x]  No     Date of Patient follow up with Physician: mid-Oct.    Surgery date: 2020    Is this a Progress Report:     [x]  Yes  []  No        If Yes:  Date Range for reporting period:  Beginning 10/19/2020  Ending 20      Progress report will be due (10 Rx or 30 days whichever is less):        Recertification will be due (POC Duration  / 90 days whichever is less): 2020      Visit # Insurance Allowable Requires auth   10 60 visits comb    []no        [x]yes:     Functional Scale: LEFS: 47/80 = 59% ability, 49% disability   Date assessed:  2020         LEFS: 74/80 = 93% ability, 7% disability on 10/19/20           LEFS: 79/80 = 99% ability, 1% disability on 20    Number of Comorbidities:  [x]0     []1-2    []3+    Latex Allergy:  [x]NO      []YES  Preferred Language for Healthcare:   [x]English       []other:      Pain level:  0/10     SUBJECTIVE:  Pt reports overall she feels 98% improved. She is now sleeping fine, daily walking and back to stairs back to normal, picking things up and carry back to normal, and don/doff pants and shoes normal. She says the only things that bothers her at this point still is picking her leg up to put it in the car with mild hip flexor pain, this continues to improve from where it was but still present. OBJECTIVE:    Observation: Gait normalized, no specific tenderness.    Test measurements:        OBJECTIVE  Test used Initial score Discharge Score   Pain Summary VAS 2-3/10 0/10   Functional questionnaire LEFS 49% disability 1% disability   Functional Testing            ROM      Hip flex  105 135 deg   Hip abd  30 40 deg   Hip ER  30 50 deg         Strength      Hip flex   5/5 mild pain hip flexor   Hip abd   5/5   Hip ER   5-/5   Hip IR      Hip ext   5/5   Knee ext   5/5   Knee flex   5/5    RESTRICTIONS/PRECAUTIONS: none    Exercises/Interventions:      Therapeutic Ex (13747) Sets/Reps/ sec Notes/CUES   Recum bike AAROM 5 min    Clamshell w/ feet up  HEP 9/21, 10/12   Supine hip abd AROM  HEP 9/21   Bridge w/ march  HEP 9/21, 10/12   Supine clamshell    Mini-squat HEP 9/21   Sit<>Stand    LAQ w/ ball squeeze    Seated bilat hip IR    Standing hip abd & ext    Prone quad stretch  HEP 9/23   Standing hurdler stretch/ROM     Supine active knee tuck/ leg extension    Step-ups    Lateral step overs    Side stepping    S/L leg circles     Leg press  [address] [place] [address] [facility] machine: hip ext    Standing hip flexor stretch    Side lunge    Cone step-overs    Paloff press w/ walkouts Green [id number] 3 punches B    Step-up w/ GH ext and high march     Pt edu 10 min Long-term use of HEP, any post-op restriction, progressing back to higher level activities   Manual Intervention (33773)     STM/MFR: L ITB,prox thigh    PROM: hip felx, abd, 90/90 ER, CINDY 3 min                       NMR re-education (71090)  CUES NEEDED   SLB  Cued for torso posture   SLB airex Steamboats, L stance  HEP 10/12   Gait training: Fwd/Bwd walking  Cued for use of full hip ext; HEP 10/12   SLB turn head     SLB torso rotation                    Therapeutic Activity (66749)                         Modalities     CP           Therapeutic Exercise and NMR EXR  [x] (82622) Provided verbal/tactile cueing for activities related to strengthening, flexibility, endurance, ROM for improvements in LE, proximal hip, and core control with self care, mobility, lifting, ambulation.  [] (15106) Provided verbal/tactile cueing for activities related to improving balance, coordination, kinesthetic sense, posture, motor skill, proprioception  to assist with LE, proximal hip, and core control in self care, mobility, lifting, ambulation and eccentric single leg control.      NMR and Therapeutic Activities:    [] (68144 or 94947) Provided verbal/tactile cueing for activities related to improving balance, coordination, kinesthetic sense, posture, motor skill, proprioception and motor activation to allow for proper function of core, proximal hip and LE with self care and ADLs  [] (84033) Gait Re-education- Provided training and instruction to the patient for proper LE, core and proximal hip recruitment and positioning and eccentric body weight control with ambulation re-education including up and down stairs     Home Exercise Program:    [x] (51890) Reviewed/Progressed HEP activities related to strengthening, flexibility, endurance, ROM of core, proximal hip and LE for functional self-care, mobility, lifting and ambulation/stair navigation   [] (64447)Reviewed/Progressed HEP activities related to improving balance, coordination, kinesthetic sense, posture, motor skill, proprioception of core, proximal hip and LE for self care, mobility, lifting, and ambulation/stair navigation      Manual Treatments:  PROM / STM / Oscillations-Mobs:  G-I, II, III, IV (PA's, Inf., Post.)  [] (67207) Provided manual therapy to mobilize LE, proximal hip and/or LS spine soft tissue/joints for the purpose of modulating pain, promoting relaxation,  increasing ROM, reducing/eliminating soft tissue swelling/inflammation/restriction, improving soft tissue extensibility and allowing for proper ROM for normal function with self care, mobility, lifting and ambulation. Modalities:     [] GAME READY (VASO)- for significant edema, swelling, pain control. Charges:  Timed Code Treatment Minutes: 39'    Total Treatment Minutes: 39'        [] EVAL (LOW) 455 1011   [] EVAL (MOD) 80444  [] EVAL (HIGH) 23150   [] RE-EVAL      [x] AB(24840) x  3  [] IONTO  [] NMR (48831) x     [] VASO  [] Manual (50490) x     [] Other:  [] TA x      [] Mech Traction (45345)  [] ES(attended) (87042)      [] ES (un) (19773):       GOALS:   Patient stated goal: Get back to full activity level and better sleep. []? Progressing: [x]? Met: []? Not Met: []? Adjusted     Therapist goals for Patient:   Short Term Goals: To be achieved in: 2 weeks  1. Independent in HEP and progression per patient tolerance, in order to prevent re-injury. []? Progressing: [x]? Met: []? Not Met: []? Adjusted  2. Patient will have a decrease in pain to facilitate improvement in movement, function, and ADLs as indicated by Functional Deficits. []? Progressing: [x]? Met: []? Not Met: []? Adjusted     Long Term Goals: To be achieved in: 8 weeks  1. Disability index score of 10% or less for the LEFS to assist with reaching prior level of function. []? Progressing: [x]? Met:1% disability on 11/11/20 []? Not Met: []? Adjusted  2. Patient will demonstrate increased AROM  WNL and/or symmetrical to other side to allow for proper joint functioning as indicated by patients Functional Deficits. []? Progressing:[x]? Met:see ROM above []? Not Met: []? Adjusted  3.  Patient will demonstrate an increase in Strength to good proximal hip strength and control, within 5lb HHD in LE to allow for proper functional mobility as indicated by patients Functional Deficits. []? Progressing: [x]? Met: []? Not Met: []? Adjusted  4. Patient will return to ambulation for at least 1 hour without AD, pain or limping and a normal reciprocal gait pattern. []? Progressing: [x]? Met: []? Not Met: []? Adjusted  5. Pt brodie to squat and  toddler grandchild with good mechanics and without pain. []? Progressing: [x]? Met: []? Not Met: []? Adjusted    Progression Towards Functional goals:  [x] Patient is progressing as expected towards functional goals listed. [] Progression is slowed due to complexities listed. [] Progression has been slowed due to co-morbidities. [] Plan just implemented, too soon to assess goals progression  [] Other:         Overall Progression Towards Functional goals/ Treatment Progress Update:  [x] Patient is progressing as expected towards functional goals listed. [] Progression is slowed due to complexities/Impairments listed. [] Progression has been slowed due to co-morbidities. [] Plan just implemented, too soon to assess goals progression <30days   [] Goals require adjustment due to lack of progress  [] Patient is not progressing as expected and requires additional follow up with physician  [] Other    Prognosis for POC: [x] Good [] Fair  [] Poor      Patient requires continued skilled intervention: [x] Yes  [] No    Treatment/Activity Tolerance:  [x] Patient able to complete treatment  [] Patient limited by fatigue  [] Patient limited by pain    [] Patient limited by other medical complications  [] Other:     ASSESSMENT: Pt has made really good progress in PT with normalized ROM, much improved strength, normalized gait and reports returning back to all her normal ADLs as well as Libertad class without pain or restriction. Pt is happy with her recovery and feels ready for D/C.     PLAN: D/C    [] Continue per plan of care [] Alter current plan (see comments above)  [] Plan of care initiated [] Hold pending MD visit [x] Discharge      Electronically signed by:  Brandon Hollis PT    Note: If patient does not return for scheduled/ recommended follow up visits, this note will serve as a discharge from care along with most recent update on progress.

## 2021-08-18 ENCOUNTER — OFFICE VISIT (OUTPATIENT)
Dept: DERMATOLOGY | Age: 61
End: 2021-08-18
Payer: COMMERCIAL

## 2021-08-18 VITALS — TEMPERATURE: 97 F

## 2021-08-18 DIAGNOSIS — L81.4 SOLAR LENTIGO: Primary | ICD-10-CM

## 2021-08-18 DIAGNOSIS — L72.0 MILIUM: ICD-10-CM

## 2021-08-18 PROCEDURE — 99213 OFFICE O/P EST LOW 20 MIN: CPT | Performed by: DERMATOLOGY

## 2021-08-18 NOTE — PROGRESS NOTES
[facility]ermat[facility]  Natasha Zaidi MD  [phone]      Jesus Borrego  1960    59 y.o. female     Date of Visit: 2021    Chief Complaint:  Skin lesions    History of Present Illness:    1. She complains of few stable pigmented lesions on the cheeks. 2.  She reports a stable asymptomatic lesion on the left inner upper eyelid. Review of Systems:  Gen: Feels well, good sense of health. Past Medical History, Family History, Surgical History, Medications and Allergies reviewed. Past Medical History:   Diagnosis Date    Arthritis     Fractures     LEFT ANKLE FX X2    PONV (postoperative nausea and vomiting)      Past Surgical History:   Procedure Laterality Date    ANKLE FRACTURE SURGERY Left     BONE CHIP REMOVED     SECTION      X1    HAND SURGERY Left     PINKY SEVERED NERVE    HIP ARTHROPLASTY Right 16    anterior    HIP SURGERY Right 11/3/15    injection    JOINT REPLACEMENT         No Known Allergies  Outpatient Medications Marked as Taking for the 21 encounter (Office Visit) with Adry Kruger MD   Medication Sig Dispense Refill    ibuprofen (ADVIL;MOTRIN) 200 MG tablet Take 200 mg by mouth every 6 hours as needed for Pain      melatonin 3 MG TABS tablet Take 3 mg by mouth daily      Calcium Carbonate-Vitamin D (CALCIUM-VITAMIN D) 500-200 MG-UNIT per tablet Take 1 tablet by mouth 2 times daily (with meals)      pimecrolimus (ELIDEL) 1 % cream Apply to the eyelids twice daily if needed for dermatitis. 30 g 2    Multiple Vitamins-Minerals (THERAPEUTIC MULTIVITAMIN-MINERALS) tablet Take 1 tablet by mouth daily         Social History:  Occupation:  Volunteers at [employer], former teacher.       Physical Examination       The following were examined and determined to be normal: Psych/Neuro, Scalp/hair, Head/face, Conjunctivae/eyelids, Gums/teeth/lips, Neck, Breast/axilla/chest, Abdomen, Back, RUE, LUE, RLE, LLE and Nails/digits.     The following were examined and determined to be abnormal: None. Well appearing. 1.  Chest, cheeks and extremities with scattered round smooth brown macules and small patches. 2.  Left upper inner eyelid - small round smooth white papule. Assessment and Plan     1. Solar lentigines    Monitor for change. Continue sun protective behaviors including use of at least SPF 30 sunscreen. 2. Milium     Reassurance. Return in about 1 year (around 8/18/2022).     --Shawn Holt MD

## 2022-08-18 ENCOUNTER — OFFICE VISIT (OUTPATIENT)
Dept: DERMATOLOGY | Age: 62
End: 2022-08-18
Payer: COMMERCIAL

## 2022-08-18 DIAGNOSIS — L57.0 ACTINIC KERATOSIS: ICD-10-CM

## 2022-08-18 DIAGNOSIS — L81.4 SOLAR LENTIGINOSIS: ICD-10-CM

## 2022-08-18 DIAGNOSIS — L82.1 SK (SEBORRHEIC KERATOSIS): Primary | ICD-10-CM

## 2022-08-18 PROCEDURE — 99212 OFFICE O/P EST SF 10 MIN: CPT | Performed by: DERMATOLOGY

## 2022-08-18 PROCEDURE — 17000 DESTRUCT PREMALG LESION: CPT | Performed by: DERMATOLOGY

## 2022-08-18 NOTE — PROGRESS NOTES
[facility] Dermatology  Philomena Hart MD  [phone]      Elisha Buck  1960    57 y.o. female     Date of Visit: 2022    Chief Complaint: skin lesions    History of Present Illness:    1. She has several asymptomatic growths on the back and upper extremities. 2.  She has stable pigmented lesions on the left cheek, neck, chest and extremities. 3.  Unknown duration of a persistent scaly lesion on the left cheek. Review of Systems:  Gen: Feels well, good sense of health. Past Medical History, Family History, Surgical History, Medications and Allergies reviewed. Past Medical History:   Diagnosis Date    Arthritis     Fractures     LEFT ANKLE FX X2    PONV (postoperative nausea and vomiting)      Past Surgical History:   Procedure Laterality Date    ANKLE FRACTURE SURGERY Left     BONE CHIP REMOVED     SECTION      X1    HAND SURGERY Left     PINKY SEVERED NERVE    HIP ARTHROPLASTY Right 16    anterior    HIP SURGERY Right 11/3/15    injection    JOINT REPLACEMENT         No Known Allergies  Outpatient Medications Marked as Taking for the 22 encounter (Office Visit) with Patrick Wright MD   Medication Sig Dispense Refill    ibuprofen (ADVIL;MOTRIN) 200 MG tablet Take 200 mg by mouth every 6 hours as needed for Pain      Calcium Carbonate-Vitamin D (CALCIUM-VITAMIN D) 500-200 MG-UNIT per tablet Take 1 tablet by mouth 2 times daily (with meals)      pimecrolimus (ELIDEL) 1 % cream Apply to the eyelids twice daily if needed for dermatitis. 30 g 2    Multiple Vitamins-Minerals (THERAPEUTIC MULTIVITAMIN-MINERALS) tablet Take 1 tablet by mouth daily         Physical Examination       The following were examined and determined to be normal: Psych/Neuro, Scalp/hair, Conjunctivae/eyelids, Gums/teeth/lips, Neck, Breast/axilla/chest, Abdomen, Back, RUE, LUE, RLE, LLE, and Nails/digits. The following were examined and determined to be abnormal: Head/face. Well appearing. 1.  Upper extremities and back -several stuck on appearing light grey brown papules and small plaques. 2.  Left cheek, neck, chest and extremities with round smooth brown macules and patches. 3.  Left cheek - 1 scaly pink macule. Assessment and Plan     1. SK (seborrheic keratosis)     Reassurance. 2. Solar lentiginosis     Monitor for change. 3. Actinic keratosis - 1    Cryotherapy was discussed and patient agreed to proceed. Consent was obtained. 1 lesions were treated cryotherapy: left cheek. 2 cycles of liquid nitrogen applied to each lesion for 5 seconds using a [employer] cryo spray gun. Patient was educated regarding the potential risks of blister formation and discomfort. Wound care was discussed. The patient tolerated the procedure well and there were no immediate complications. Return in about 1 year (around 8/18/2023).     --Pietro Mcmillan MD

## 2022-08-25 ENCOUNTER — OFFICE VISIT (OUTPATIENT)
Dept: DERMATOLOGY | Age: 62
End: 2022-08-25
Payer: COMMERCIAL

## 2022-08-25 DIAGNOSIS — L23.9 ALLERGIC CONTACT DERMATITIS, UNSPECIFIED TRIGGER: Primary | ICD-10-CM

## 2022-08-25 PROCEDURE — 99213 OFFICE O/P EST LOW 20 MIN: CPT | Performed by: DERMATOLOGY

## 2022-08-25 RX ORDER — CLOBETASOL PROPIONATE 0.5 MG/G
CREAM TOPICAL
Qty: 30 G | Refills: 0 | Status: SHIPPED | OUTPATIENT
Start: 2022-08-25

## 2022-08-25 RX ORDER — PREDNISONE 10 MG/1
TABLET ORAL
Qty: 40 TABLET | Refills: 0 | Status: SHIPPED | OUTPATIENT
Start: 2022-08-25 | End: 2022-09-10

## 2022-08-25 NOTE — PROGRESS NOTES
[facility] Dermatology  Ramakrishna Hercules MD  [phone]      Zach Cabrera  1960    57 y.o. female     Date of Visit: 2022    Chief Complaint: rash    History of Present Illness:    She presents today for a several day history of an intensely pruritic eruption on the second webspace of the right hand and now affecting few other webspaces of the left hand. Not aware of any exposures. Review of Systems:  Gen: Feels well, good sense of health. Past Medical History, Family History, Surgical History, Medications and Allergies reviewed. Past Medical History:   Diagnosis Date    Arthritis     Fractures     LEFT ANKLE FX X2    PONV (postoperative nausea and vomiting)      Past Surgical History:   Procedure Laterality Date    ANKLE FRACTURE SURGERY Left     BONE CHIP REMOVED     SECTION      X1    HAND SURGERY Left 1982    PINKY SEVERED NERVE    HIP ARTHROPLASTY Right 16    anterior    HIP SURGERY Right 11/3/15    injection    JOINT REPLACEMENT         No Known Allergies  Outpatient Medications Marked as Taking for the 22 encounter (Office Visit) with Evette Goldberg MD   Medication Sig Dispense Refill    clobetasol (TEMOVATE) 0.05 % cream Apply to affected area twice daily for up to 2 weeks or until improved. 30 g 0    predniSONE (DELTASONE) 10 MG tablet Take 4 tablets by mouth daily for 4 days, THEN 3 tablets daily for 4 days, THEN 2 tablets daily for 4 days, THEN 1 tablet daily for 4 days. 40 tablet 0    ibuprofen (ADVIL;MOTRIN) 200 MG tablet Take 200 mg by mouth every 6 hours as needed for Pain      Calcium Carbonate-Vitamin D (CALCIUM-VITAMIN D) 500-200 MG-UNIT per tablet Take 1 tablet by mouth 2 times daily (with meals)      pimecrolimus (ELIDEL) 1 % cream Apply to the eyelids twice daily if needed for dermatitis.  30 g 2    Multiple Vitamins-Minerals (THERAPEUTIC MULTIVITAMIN-MINERALS) tablet Take 1 tablet by mouth daily           Physical Examination Well appearing. 1.  2nd webspace of the right hand with coalescing erythematous vesicles and bullae. Middle web spaces of the left hand with few pink vesicles. Assessment and Plan     1. Allergic contact dermatitis, etiology unclear    Clobetasol cream twice daily until improved. Prescription provided for a prednisone taper in case it is needed.          --Dada Humphreys MD

## 2023-08-24 ENCOUNTER — OFFICE VISIT (OUTPATIENT)
Dept: DERMATOLOGY | Age: 63
End: 2023-08-24
Payer: MEDICARE

## 2023-08-24 DIAGNOSIS — L82.1 SK (SEBORRHEIC KERATOSIS): ICD-10-CM

## 2023-08-24 DIAGNOSIS — L81.4 SOLAR LENTIGINOSIS: Primary | ICD-10-CM

## 2023-08-24 DIAGNOSIS — D18.01 CHERRY ANGIOMA: ICD-10-CM

## 2023-08-24 PROCEDURE — 3017F COLORECTAL CA SCREEN DOC REV: CPT | Performed by: DERMATOLOGY

## 2023-08-24 PROCEDURE — 99213 OFFICE O/P EST LOW 20 MIN: CPT | Performed by: DERMATOLOGY

## 2023-08-24 PROCEDURE — G8427 DOCREV CUR MEDS BY ELIG CLIN: HCPCS | Performed by: DERMATOLOGY

## 2023-08-24 PROCEDURE — G8400 PT W/DXA NO RESULTS DOC: HCPCS | Performed by: DERMATOLOGY

## 2023-08-24 PROCEDURE — G8421 BMI NOT CALCULATED: HCPCS | Performed by: DERMATOLOGY

## 2023-08-24 PROCEDURE — 1123F ACP DISCUSS/DSCN MKR DOCD: CPT | Performed by: DERMATOLOGY

## 2023-08-24 PROCEDURE — 1036F TOBACCO NON-USER: CPT | Performed by: DERMATOLOGY

## 2023-08-24 PROCEDURE — 1090F PRES/ABSN URINE INCON ASSESS: CPT | Performed by: DERMATOLOGY

## 2023-08-24 NOTE — PROGRESS NOTES
[facility] Dermatology  Joselin Adames MD  [phone]      Aileen Knee  1960    70 y.o. female     Date of Visit: 2023    Chief Complaint: skin lesions    History of Present Illness:    1. She has stable pigmented lesions on the face, chest and extremities. 2.  She has an asymptomatic growth on the back. 3.  She has multiple asymptomatic red lesions on the anterior trunk. Review of Systems:  Gen: Feels well, good sense of health. Past Medical History, Family History, Surgical History, Medications and Allergies reviewed. Past Medical History:   Diagnosis Date    Arthritis     Fractures     LEFT ANKLE FX X2    PONV (postoperative nausea and vomiting)      Past Surgical History:   Procedure Laterality Date    ANKLE FRACTURE SURGERY Left     BONE CHIP REMOVED     SECTION      X1    HAND SURGERY Left     PINKY SEVERED NERVE    HIP ARTHROPLASTY Right 16    anterior    HIP SURGERY Right 11/3/15    injection    JOINT REPLACEMENT         No Known Allergies  Outpatient Medications Marked as Taking for the 23 encounter (Office Visit) with Fuentes Nascimento MD   Medication Sig Dispense Refill    clobetasol (TEMOVATE) 0.05 % cream Apply to affected area twice daily for up to 2 weeks or until improved. 30 g 0    ibuprofen (ADVIL;MOTRIN) 200 MG tablet Take 1 tablet by mouth every 6 hours as needed for Pain      Calcium Carbonate-Vitamin D (CALCIUM-VITAMIN D) 500-200 MG-UNIT per tablet Take 1 tablet by mouth 2 times daily (with meals)      pimecrolimus (ELIDEL) 1 % cream Apply to the eyelids twice daily if needed for dermatitis.  30 g 2    Multiple Vitamins-Minerals (THERAPEUTIC MULTIVITAMIN-MINERALS) tablet Take 1 tablet by mouth daily           Physical Examination       The following were examined and determined to be normal: Psych/Neuro, Scalp/hair, Head/face, Conjunctivae/eyelids, Gums/teeth/lips, Neck, Breast/axilla/chest, Abdomen, Back, RUE, LUE, RLE,

## 2024-08-26 ENCOUNTER — OFFICE VISIT (OUTPATIENT)
Dept: DERMATOLOGY | Age: 64
End: 2024-08-26
Payer: MEDICARE

## 2024-08-26 DIAGNOSIS — L81.4 SOLAR LENTIGINOSIS: Primary | ICD-10-CM

## 2024-08-26 DIAGNOSIS — R23.3 PETECHIAE: ICD-10-CM

## 2024-08-26 PROCEDURE — G8421 BMI NOT CALCULATED: HCPCS | Performed by: DERMATOLOGY

## 2024-08-26 PROCEDURE — 99213 OFFICE O/P EST LOW 20 MIN: CPT | Performed by: DERMATOLOGY

## 2024-08-26 PROCEDURE — 3017F COLORECTAL CA SCREEN DOC REV: CPT | Performed by: DERMATOLOGY

## 2024-08-26 PROCEDURE — 1036F TOBACCO NON-USER: CPT | Performed by: DERMATOLOGY

## 2024-08-26 PROCEDURE — G8400 PT W/DXA NO RESULTS DOC: HCPCS | Performed by: DERMATOLOGY

## 2024-08-26 PROCEDURE — 1090F PRES/ABSN URINE INCON ASSESS: CPT | Performed by: DERMATOLOGY

## 2024-08-26 PROCEDURE — 1123F ACP DISCUSS/DSCN MKR DOCD: CPT | Performed by: DERMATOLOGY

## 2024-08-26 PROCEDURE — G8427 DOCREV CUR MEDS BY ELIG CLIN: HCPCS | Performed by: DERMATOLOGY

## 2024-08-26 NOTE — PROGRESS NOTES
[facility] Dermatology  Bernardino Benedict MD  [phone]      Concha Whitaker  1960    64 y.o. female     Date of Visit: 2024    Chief Complaint: skin moles    History of Present Illness:    1.  She presents today for evaluation of multiple pigmented lesions on the chest and extremities-not aware of any concerning changes.    2.  She reports recently noted asymptomatic lesions on the medial lower legs that appeared after walking a lot on a vacation to [place].      Review of Systems:  Gen: Feels well, good sense of health.    Past Medical History, Family History, Surgical History, Medications and Allergies reviewed.    Past Medical History:   Diagnosis Date    Arthritis     Fractures     LEFT ANKLE FX X2    PONV (postoperative nausea and vomiting)      Past Surgical History:   Procedure Laterality Date    ANKLE FRACTURE SURGERY Left     BONE CHIP REMOVED     SECTION      X1    HAND SURGERY Left     PINKY SEVERED NERVE    HIP ARTHROPLASTY Right 16    anterior    HIP SURGERY Right 11/3/15    injection    JOINT REPLACEMENT         No Known Allergies  Outpatient Medications Marked as Taking for the 24 encounter (Office Visit) with Bernardino Benedict MD   Medication Sig Dispense Refill    clobetasol (TEMOVATE) 0.05 % cream Apply to affected area twice daily for up to 2 weeks or until improved. 30 g 0    ibuprofen (ADVIL;MOTRIN) 200 MG tablet Take 1 tablet by mouth every 6 hours as needed for Pain      Calcium Carbonate-Vitamin D (CALCIUM-VITAMIN D) 500-200 MG-UNIT per tablet Take 1 tablet by mouth 2 times daily (with meals)      Multiple Vitamins-Minerals (THERAPEUTIC MULTIVITAMIN-MINERALS) tablet Take 1 tablet by mouth daily           Physical Examination       Well appearing.    1.  Cheeks, chest, extensor extremities with multiple round smooth tan macules and patches.    2.  Medial lower legs with few petechial macules.       Assessment and Plan     1. Solar lentiginosis

## 2025-08-27 ENCOUNTER — OFFICE VISIT (OUTPATIENT)
Age: 65
End: 2025-08-27
Payer: MEDICARE

## 2025-08-27 DIAGNOSIS — L57.0 ACTINIC KERATOSIS: ICD-10-CM

## 2025-08-27 DIAGNOSIS — L82.1 SK (SEBORRHEIC KERATOSIS): ICD-10-CM

## 2025-08-27 DIAGNOSIS — D69.2 SOLAR PURPURA: Primary | ICD-10-CM

## 2025-08-27 PROCEDURE — 3017F COLORECTAL CA SCREEN DOC REV: CPT | Performed by: DERMATOLOGY

## 2025-08-27 PROCEDURE — 99211 OFF/OP EST MAY X REQ PHY/QHP: CPT | Performed by: DERMATOLOGY

## 2025-08-27 PROCEDURE — 17000 DESTRUCT PREMALG LESION: CPT | Performed by: DERMATOLOGY

## 2025-08-27 PROCEDURE — G8421 BMI NOT CALCULATED: HCPCS | Performed by: DERMATOLOGY

## 2025-08-27 PROCEDURE — 99212 OFFICE O/P EST SF 10 MIN: CPT | Performed by: DERMATOLOGY

## 2025-08-27 PROCEDURE — 1036F TOBACCO NON-USER: CPT | Performed by: DERMATOLOGY

## 2025-08-27 PROCEDURE — 1160F RVW MEDS BY RX/DR IN RCRD: CPT | Performed by: DERMATOLOGY

## 2025-08-27 PROCEDURE — 1090F PRES/ABSN URINE INCON ASSESS: CPT | Performed by: DERMATOLOGY

## 2025-08-27 PROCEDURE — G8400 PT W/DXA NO RESULTS DOC: HCPCS | Performed by: DERMATOLOGY

## 2025-08-27 PROCEDURE — 1123F ACP DISCUSS/DSCN MKR DOCD: CPT | Performed by: DERMATOLOGY

## 2025-08-27 PROCEDURE — G8427 DOCREV CUR MEDS BY ELIG CLIN: HCPCS | Performed by: DERMATOLOGY

## 2025-08-27 PROCEDURE — 1159F MED LIST DOCD IN RCRD: CPT | Performed by: DERMATOLOGY

## 2025-08-27 RX ORDER — ALENDRONATE SODIUM 70 MG/1
TABLET ORAL
COMMUNITY
Start: 2025-08-11